# Patient Record
Sex: FEMALE | Race: WHITE | NOT HISPANIC OR LATINO | Employment: OTHER | URBAN - METROPOLITAN AREA
[De-identification: names, ages, dates, MRNs, and addresses within clinical notes are randomized per-mention and may not be internally consistent; named-entity substitution may affect disease eponyms.]

---

## 2018-02-12 ENCOUNTER — OFFICE VISIT (OUTPATIENT)
Dept: OBGYN CLINIC | Facility: HOSPITAL | Age: 68
End: 2018-02-12
Payer: COMMERCIAL

## 2018-02-12 VITALS
HEIGHT: 68 IN | SYSTOLIC BLOOD PRESSURE: 159 MMHG | WEIGHT: 148 LBS | HEART RATE: 53 BPM | BODY MASS INDEX: 22.43 KG/M2 | DIASTOLIC BLOOD PRESSURE: 92 MMHG

## 2018-02-12 DIAGNOSIS — M19.011 PRIMARY OSTEOARTHRITIS OF RIGHT SHOULDER: Primary | ICD-10-CM

## 2018-02-12 PROCEDURE — 99203 OFFICE O/P NEW LOW 30 MIN: CPT | Performed by: ORTHOPAEDIC SURGERY

## 2018-02-12 RX ORDER — ESCITALOPRAM OXALATE 10 MG/1
10 TABLET ORAL
COMMUNITY
Start: 2017-11-27

## 2018-02-12 RX ORDER — LEVOTHYROXINE SODIUM 0.12 MG/1
1 TABLET ORAL
COMMUNITY
Start: 2012-02-15

## 2018-02-12 RX ORDER — DICLOFENAC SODIUM 75 MG/1
TABLET, DELAYED RELEASE ORAL
COMMUNITY
Start: 2018-02-09 | End: 2018-04-09

## 2018-02-12 RX ORDER — INFLUENZA A VIRUSA/MICHIGAN/45/2015 X-275 (H1N1) ANTIGEN (FORMALDEHYDE INACTIVATED), INFLUENZA A VIRUS A/HONG KONG/4801/2014 X-263B (H3N2) ANTIGEN (FORMALDEHYDE INACTIVATED), AND INFLUENZA B VIRUS B/BRISBANE/60/2008 ANTIGEN (FORMALDEHYDE INACTIVATED) 60; 60; 60 UG/.5ML; UG/.5ML; UG/.5ML
INJECTION, SUSPENSION INTRAMUSCULAR
COMMUNITY
Start: 2017-11-20 | End: 2018-04-09

## 2018-02-12 NOTE — PATIENT INSTRUCTIONS
Patient to schedule CT scan of the right shoulder and an MRI of the right shoulder, both without contrast   Patient to return following studies to discuss possible surgical intervention

## 2018-02-12 NOTE — PROGRESS NOTES
Assessment/Plan:    Primary osteoarthritis right shoulder  The patient was seen by both Dr Zac Reece and myself  We reviewed the x-ray findings and discussed the importance of obtaining a CT scan and an MRI to assess the integrity of her rotator cuff as well as the bony structure, in anticipation of a possible total shoulder arthroplasty  Patient will return following studies being completed  Subjective:   Patient ID: Josh Rajan is a 75 y o  female  This 59-year-old female presents to the office for evaluation of her right shoulder pain  She reports that she has had pain for approximately 5 years now and her symptoms have progressively worsened  She has had intra-articular injections which has provided minimal relief, last 1 being last October  She has seen other orthopedic providers who have told her she has arthritis  She denies any numbness or tingling  She is right-hand dominant  Review of Systems   Constitutional: Negative for chills and fever  Respiratory: Negative for shortness of breath and wheezing  Musculoskeletal: Positive for arthralgias and myalgias  Neurological: Positive for weakness  Negative for numbness  Objective:  Right Shoulder Exam     Tenderness   The patient is experiencing tenderness in the biceps tendon  Range of Motion   Active Abduction:  40 abnormal   Passive Abduction:  60 abnormal   Forward Flexion:  60 abnormal   External Rotation:  20 abnormal     Muscle Strength   Abduction: 1/5   Internal Rotation: 1/5   External Rotation: 1/5   Supraspinatus: 1/5   Subscapularis: 1/5   Biceps: 4/5     Tests   Drop Arm: negative  Hawkin's test: positive  Sulcus: absent    Other   Erythema: absent  Scars: absent  Sensation: normal  Pulse: present          Neurological Testing     Additional Neurological Details  Sensation grossly intact  Physical Exam   Constitutional: She is oriented to person, place, and time   She appears well-developed and well-nourished  HENT:   Head: Normocephalic  Pulmonary/Chest: Effort normal    Neurological: She is alert and oriented to person, place, and time  Sensation grossly intact  Skin: Skin is warm and dry  Psychiatric: She has a normal mood and affect  Her behavior is normal        I have personally reviewed pertinent films in PACS  and I have personally reviewed pertinent films in PACS and my interpretation is There is primary osteoarthritis of the proximal right humerus and glenohumeral joint  Aleksandr Bhatia

## 2018-02-15 ENCOUNTER — HOSPITAL ENCOUNTER (OUTPATIENT)
Dept: RADIOLOGY | Facility: HOSPITAL | Age: 68
Discharge: HOME | End: 2018-02-15
Payer: COMMERCIAL

## 2018-02-15 DIAGNOSIS — M19.011 PRIMARY OSTEOARTHRITIS OF RIGHT SHOULDER: ICD-10-CM

## 2018-02-15 PROCEDURE — 73221 MRI JOINT UPR EXTREM W/O DYE: CPT

## 2018-02-15 PROCEDURE — 73200 CT UPPER EXTREMITY W/O DYE: CPT

## 2018-02-16 ENCOUNTER — TELEPHONE (OUTPATIENT)
Dept: OBGYN CLINIC | Facility: HOSPITAL | Age: 68
End: 2018-02-16

## 2018-02-16 NOTE — TELEPHONE ENCOUNTER
Lesvia Lin from [address] [place] [address] is calling to let you know that there are significant findings on the patients ct of rt shoulder

## 2018-02-19 ENCOUNTER — OFFICE VISIT (OUTPATIENT)
Dept: OBGYN CLINIC | Facility: HOSPITAL | Age: 68
End: 2018-02-19
Payer: COMMERCIAL

## 2018-02-19 VITALS
SYSTOLIC BLOOD PRESSURE: 164 MMHG | BODY MASS INDEX: 22.97 KG/M2 | HEART RATE: 59 BPM | HEIGHT: 68 IN | DIASTOLIC BLOOD PRESSURE: 90 MMHG | WEIGHT: 151.6 LBS

## 2018-02-19 DIAGNOSIS — M19.011 PRIMARY OSTEOARTHRITIS OF RIGHT SHOULDER: Primary | ICD-10-CM

## 2018-02-19 PROCEDURE — 99214 OFFICE O/P EST MOD 30 MIN: CPT | Performed by: ORTHOPAEDIC SURGERY

## 2018-02-19 NOTE — PROGRESS NOTES
Assessment/Plan:  Assessment/Plan   Diagnoses and all orders for this visit:    Primary osteoarthritis of right shoulder      Discussion:  The patient has glenohumeral osteoarthritis with an intact rotator cuff, has failed to improve with appropriate nonoperative care and is indicated for total shoulder arthroplasty, anatomic total shoulder is appropriate and I will have available a posterior augmented glenoid given the 10° of retroversion  A thorough discussion was performed with the patient reviewing all operative and nonoperative options as well as the risks of the procedure  Risks discussed include but not limited to persistent pain, dislocation, loosening of the prosthesis, infection, need for further surgery including revision to a reverse total shoulder, rotator cuff rupture , neurovascular injury, as well as the risk of anesthesia  After this discussion all questions were answered and informed consent was obtained for Total Shoulder Arthroplasty of the right shoulder          Subjective:   Patient ID: Indigo Gonzales is a 75 y o  female  Patient returns for follow-up of the imaging of her right shoulder to help us understand what type of arthroplasty is appropriate for her right shoulder  We discussed the process at length and she has stated to me today that she feels comfortable with me performing the procedure with the technique that I described, she continues to have significant pain in her right shoulder is looking good maybe schedule in May to have the shoulder replaced  The following portions of the patient's history were reviewed and updated as appropriate: allergies, current medications, past family history, past medical history, past social history, past surgical history and problem list     Review of Systems   Constitutional: Negative for chills and fever  HENT: Negative for hearing loss  Eyes: Negative for visual disturbance     Respiratory: Negative for shortness of breath  Cardiovascular: Negative for chest pain  Gastrointestinal: Negative for abdominal pain  Musculoskeletal:        As reviewed in the HPI   Skin: Negative for rash  Neurological:        As reviewed in the HPI   Psychiatric/Behavioral: Negative for agitation  Objective:  Right Shoulder Exam     Tenderness   Right shoulder tenderness location: Tender about glenohumeral joint  Muscle Strength   Abduction: 4/5   External Rotation: 4/5     Tests   Cross Arm: negative  Drop Arm: negative    Other   Erythema: absent  Scars: absent  Sensation: normal  Pulse: present    Comments:  Globally limited range of motion secondary to pain and crepitation            Physical Exam   Constitutional: She is oriented to person, place, and time  She appears well-developed and well-nourished  HENT:   Head: Normocephalic and atraumatic  Neck: Normal range of motion  Neck supple  Cardiovascular: Normal rate and regular rhythm  Pulmonary/Chest: Effort normal  She has no wheezes  Abdominal: Soft  She exhibits no distension  Neurological: She is alert and oriented to person, place, and time  Skin: Skin is warm and dry  Psychiatric: She has a normal mood and affect  Her behavior is normal    Nursing note and vitals reviewed  I have personally reviewed pertinent films in PACS and my interpretation is as follows      Right shoulder CT scan shows posterior glenoid wear with approximately 10° of retroversion    Right shoulder MRI shows advanced glenohumeral arthritis with an intact rotator cuff

## 2018-02-19 NOTE — PATIENT INSTRUCTIONS
What to Expect Before and After Shoulder Replacement Surgery  You are being scheduled for a shoulder replacement by Dr Bakari Hanna to treat your shoulder condition  Here is some information which may help to answer questions that you may have  Please do not hesitate to reach out to our team to answer questions not addressed here  Before Surgery  You will be contacted the evening prior to your surgery to confirm the scheduled time of the procedure and when to arrive at the hospital    Do not eat or drink anything after midnight the night before your surgery so that the anesthesia can be performed safely  If you have been fitted for a sling in the office prior to the surgery please remember to bring it to the hospital   You will meet the anesthesiologist the morning of the surgery  The surgery is performed under a general anesthetic but they will also offer you a regional block (shot to numb the arm) to help control your post-operative pain as well as with a catheter that is left in place after the block  The catheter is connected to a small pump which will continue to provide numbing medicine and help prolong the pain control from the block  Unfortunately this catheter is not as effective as the initial block, but can still be very helpful in managing the pain  After Surgery and in the Hospital  The shoulder replacement surgery typically takes 60-90 minutes  When surgery is completed, your surgeon will update your family and friends on your condition and progress  You will remain in the recovery room for at least an hour or until the anesthesia has worn off and your blood pressure and pulse are stable  If you have pain, the nurses will give you medication  Once out of surgery, your surgeon will decide on how long you will be using a sling in order to protect and position your shoulder  However, this won't keep you from starting physical therapy    Exercises typically begin on the day after surgery with emphasis on moving the shoulder, wrist, and hand  The physical therapist will be provided with a detailed protocol but typically the first 6 weeks are used to regain range of motion and then strengthening is initiated  Starting strengthening exercises too early may lead to complications  When You Are Discharged from the [address] [place] [address] can expect to be released from the hospital the day after surgery (this may change if you have special needs or medical conditions)  Before you are released, the treatment team (Orthopaedic surgery residents, physician assistants and physical therapists) will talk with you about the importance of limiting any sudden or stressful movements to the arm for several weeks or longer  Activities that involve pushing, pulling, and lifting should not be done until you are given permission from your surgeon  Your First Day at [address] [place] [address] may need help with your daily activities, so it is a good idea to have family and friends prepared to help you  It is okay to remove the sling and let the arm hang at the side so that you can get cleaned and change your clothes  To put on a shirt, place the bad arm in first and then the good arm  Reverse to take it off  Don't forget to wear the sling every night for at least the first month after surgery, and never use your arm to push yourself up in bed or from a chair  The added weight on your shoulder may cause you to re-injure the joint  How to [place] in the First Week  You are encouraged to return to your normal eating and sleeping patterns as soon as possible  It is important for you to be active in order to control your weight and muscle tone  It is ok to increase your activity level and even perform light aerobic exercise (like walking or riding a stationary bike) within the first week or so if you are feeling up to it    If there is concern about these activates best to wait until the first post-operative visit and discuss this with the celia Gomez Ridchance might be able to return to work within several days if you can perform your job while wearing a sling  Consult with your doctor, as this differs from patient to patient  However, if your job requires heavy lifting or climbing, there may be a delay for several months  Until you are seen for your first follow-up visit, please try and keep wound dry  It is okay to shower but try your best to keep the incision out of direct contact with the water (or consider using a waterproof bandage)  If it does gets wet please dry as best as possible afterwards  If you notice any drainage or a foul odor from your incision or your temperature goes above 101 5 degrees, please contact the office  What You Can Expect in the First Month  Your first post-operative appointment will be with Dr Bruno Sahu physician assistant (PA) around 2 weeks after the surgery  You skin staples will be removed and X-rays will be obtained at that visit  Please understand that it is quite common to still experience pain at that time but the pain should be steadily improving  Hopefully physical therapy has already begun but if not it will be initiated at this visit and will continue for the 8-12 weeks  At about 12 weeks after surgery you will start a progressive strengthening program  Physical therapy is a deliberate process of not only strengthening your shoulder but also altering how you use your arm  It may be many months before your desired results are achieved, so do not get discouraged  Your shoulder will generally continue to improve steadily up to 6-8 months after surgery  After that point further improvement is very slow; although it has been shown that even after a year or more, activity can increase as muscle strength continues to improve  After the First Month at Home   Because each person heals differently, there are different recovery timelines  An average recovery period typically lasts about between 3-6 months  Talk with your surgeon about which activities will be appropriate for you once you have recovered

## 2018-03-15 DIAGNOSIS — M19.011 ARTHRITIS OF RIGHT SHOULDER REGION: Primary | ICD-10-CM

## 2018-04-09 RX ORDER — GABAPENTIN 100 MG/1
100 CAPSULE ORAL
COMMUNITY
End: 2019-07-25

## 2018-04-09 NOTE — PRE-PROCEDURE INSTRUCTIONS
Pre-Surgery Instructions:   Medication Instructions    Calcium Carbonate-Vitamin D (CALTRATE 600+D PO) Instructed patient per Anesthesia Guidelines   Cholecalciferol 1000 units tablet Instructed patient per Anesthesia Guidelines   escitalopram (LEXAPRO) 10 mg tablet epic    levothyroxine 125 mcg tablet epic    Resveratrol-Quercetin 100-100 MG TABS Instructed patient per Anesthesia Guidelines  Antidepressant Med Class     Continue to take this medication on your normal schedule  If this is an oral medication and you take it in the morning, then you may take this medicine with a sip of water  Herbal Med Class     Stop taking this herbal medications at least one week prior to surgery/procedure  Thyroxine Med Class     Continue to take this medication on your normal schedule  If this is an oral medication and you take it in the morning, then you may take this medicine with a sip of water    Pre Procedure instructions reviewed verbalizes understanding

## 2018-04-23 ENCOUNTER — EVALUATION (OUTPATIENT)
Dept: PHYSICAL THERAPY | Facility: REHABILITATION | Age: 68
End: 2018-04-23
Payer: COMMERCIAL

## 2018-04-23 DIAGNOSIS — M19.011 PRIMARY OSTEOARTHRITIS OF RIGHT SHOULDER: Primary | ICD-10-CM

## 2018-04-23 PROCEDURE — G8991 OTHER PT/OT GOAL STATUS: HCPCS | Performed by: PHYSICAL THERAPIST

## 2018-04-23 PROCEDURE — 97161 PT EVAL LOW COMPLEX 20 MIN: CPT | Performed by: PHYSICAL THERAPIST

## 2018-04-23 PROCEDURE — 97110 THERAPEUTIC EXERCISES: CPT | Performed by: PHYSICAL THERAPIST

## 2018-04-23 PROCEDURE — G8990 OTHER PT/OT CURRENT STATUS: HCPCS | Performed by: PHYSICAL THERAPIST

## 2018-04-23 NOTE — PROGRESS NOTES
PT Evaluation     Today's date: 2018  Patient name: Jewels Bradford  : 1950  MRN: [id number]  Referring provider: Edgar Allen MD  Dx:   Encounter Diagnosis     ICD-10-CM    1  Primary osteoarthritis of right shoulder M19 [id number] Ambulatory referral to Physical Therapy                  Assessment    Assessment details: Patient presents with  Shoulder pain, decreased ROM, decreased strength, and decreased function secondary to R shoulder OA  Pt is scheduled for R TSA 18  Patient would benefit from skilled PT intervention post-operatively  to address these issues and to maximize function  Thank you for the referral   Understanding of Dx/Px/POC: good   Prognosis: good    Goals  Short Term:  Pt will report decreased levels of pain by at least 2 subjective ratings in 4 weeks  Pt will demonstrate improved ROM by at least 10 degrees in 4 weeks  Pt will be independent with current HEP  Long term goals (to be achieved in 12 weeks):  Pt will demonstrate functional AROM  Pt will be independent in HEP Long Term  Pt will demonstrate improved FOTO, > 63  Pt will be independent with all ADL's    Plan  Planned modality interventions: cryotherapy  Planned therapy interventions: joint mobilization, manual therapy, neuromuscular re-education, patient education, therapeutic exercise, activity modification and home exercise program  Frequency: 2x week  Duration in visits: 12  Treatment plan discussed with: patient        Subjective Evaluation    History of Present Illness  Mechanism of injury: Pt is a 70 y o female with a c/o ongoing R shoulder pain for at least 5 years of insidious onset  Pt was diagnosed with OA and has been getting a series of injections  Pt saw Dr Ginny Snell in February for another opinion  Pt had radiographs, CT scan, and MRI, which showed OA and wearing away of the glenoid, as per pt  Pt is scheduled for surgery 17    Pt is R hand dominant and reports pain/difficulty with dressing (compensates), lifting things, carrying things, reaching, OH activity, weight-bearing through R UE  Pt would like to resume gardening, golfing, swimming  Pain  At best pain ratin  At worst pain ratin  Location: R shoulder  Relieving factors: ice and medications          Objective     Active Range of Motion     Right Shoulder   Flexion: 70 degrees with pain  Abduction: 48 degrees with pain  External rotation 0°: 50 degrees with pain  Internal rotation BTB: sacrum with pain    Passive Range of Motion     Right Shoulder   Flexion: 140 degrees with pain  Abduction: 120 degrees with pain  External rotation 90°: 50 degrees with pain  Internal rotation 90°: 20 degrees with pain    Strength/Myotome Testing     Right Shoulder     Planes of Motion   Flexion: 3-   Abduction: 3-   External rotation at 0°: 4-   Internal rotation at 0°: 4-     General Comments     Shoulder Comments   Pt reports consistent crepitus  Reviewed TSA protocol and HEP this session  Pt demonstrates good understanding of both  Flowsheet Rows    Flowsheet Row Most Recent Value   PT/OT G-Codes   Current Score  49   Projected Score  61   FOTO information reviewed  Yes [Pre-op evaluation]   Assessment Type  Evaluation   G code set  Other PT/OT Primary   Other PT Primary Current Status ()  CK   Other PT Primary Goal Status ()  CJ          Precautions: Oa, HTN, history of thyroid cancer and thyroid surgery  Follow MD's protocol      Daily Treatment Diary     Manual              R shoulder PROM flexion, ER to limit set by MD (elbow at side), Ir, Cross body adduction                                                                     Exercise Diary              Pendulums a/p             scap squeeze             Elbow AROM             Wrist AROM             digiflex             C/S AROM (SB+rot) Modalities              CP PRN

## 2018-04-25 RX ORDER — VALSARTAN 160 MG/1
160 TABLET ORAL
COMMUNITY

## 2018-04-25 RX ORDER — VALSARTAN 160 MG/1
TABLET ORAL
Status: ON HOLD | COMMUNITY
Start: 2018-04-17 | End: 2018-05-01

## 2018-04-30 ENCOUNTER — ANESTHESIA EVENT (OUTPATIENT)
Dept: PERIOP | Facility: HOSPITAL | Age: 68
DRG: 483 | End: 2018-04-30
Payer: COMMERCIAL

## 2018-05-01 ENCOUNTER — ANESTHESIA (OUTPATIENT)
Dept: PERIOP | Facility: HOSPITAL | Age: 68
DRG: 483 | End: 2018-05-01
Payer: COMMERCIAL

## 2018-05-01 ENCOUNTER — HOSPITAL ENCOUNTER (INPATIENT)
Facility: HOSPITAL | Age: 68
LOS: 1 days | Discharge: HOME | DRG: 483 | End: 2018-05-02
Attending: ORTHOPAEDIC SURGERY | Admitting: ORTHOPAEDIC SURGERY
Payer: COMMERCIAL

## 2018-05-01 ENCOUNTER — APPOINTMENT (INPATIENT)
Dept: RADIOLOGY | Facility: HOSPITAL | Age: 68
DRG: 483 | End: 2018-05-01
Payer: COMMERCIAL

## 2018-05-01 DIAGNOSIS — M19.011 PRIMARY OSTEOARTHRITIS OF RIGHT SHOULDER: Primary | ICD-10-CM

## 2018-05-01 LAB
ABO GROUP BLD: NORMAL
BLD GP AB SCN SERPL QL: NEGATIVE
RH BLD: POSITIVE
SPECIMEN EXPIRATION DATE: NORMAL

## 2018-05-01 PROCEDURE — C1713 ANCHOR/SCREW BN/BN,TIS/BN: HCPCS | Performed by: ORTHOPAEDIC SURGERY

## 2018-05-01 PROCEDURE — C1776 JOINT DEVICE (IMPLANTABLE): HCPCS | Performed by: ORTHOPAEDIC SURGERY

## 2018-05-01 PROCEDURE — 23430 REPAIR BICEPS TENDON: CPT | Performed by: ORTHOPAEDIC SURGERY

## 2018-05-01 PROCEDURE — 23472 RECONSTRUCT SHOULDER JOINT: CPT | Performed by: ORTHOPAEDIC SURGERY

## 2018-05-01 PROCEDURE — 73020 X-RAY EXAM OF SHOULDER: CPT

## 2018-05-01 PROCEDURE — 0RRJ0JZ REPLACEMENT OF RIGHT SHOULDER JOINT WITH SYNTHETIC SUBSTITUTE, OPEN APPROACH: ICD-10-PCS | Performed by: ORTHOPAEDIC SURGERY

## 2018-05-01 PROCEDURE — 86901 BLOOD TYPING SEROLOGIC RH(D): CPT | Performed by: ORTHOPAEDIC SURGERY

## 2018-05-01 PROCEDURE — 86900 BLOOD TYPING SEROLOGIC ABO: CPT | Performed by: ORTHOPAEDIC SURGERY

## 2018-05-01 PROCEDURE — 0LS30ZZ REPOSITION RIGHT UPPER ARM TENDON, OPEN APPROACH: ICD-10-PCS | Performed by: ORTHOPAEDIC SURGERY

## 2018-05-01 PROCEDURE — 86850 RBC ANTIBODY SCREEN: CPT | Performed by: ORTHOPAEDIC SURGERY

## 2018-05-01 DEVICE — SMARTSET HV HIGH VISCOSITY BONE CEMENT 40G
Type: IMPLANTABLE DEVICE | Site: SHOULDER | Status: FUNCTIONAL
Brand: SMARTSET

## 2018-05-01 DEVICE — IMPLANTABLE DEVICE
Type: IMPLANTABLE DEVICE | Site: SHOULDER | Status: FUNCTIONAL
Brand: AEQUALIS™ ASCEND™ FLEX

## 2018-05-01 DEVICE — IMPLANTABLE DEVICE
Type: IMPLANTABLE DEVICE | Site: SHOULDER | Status: FUNCTIONAL
Brand: FLEX SHOULDER SYSTEM

## 2018-05-01 DEVICE — IMPLANTABLE DEVICE: Type: IMPLANTABLE DEVICE | Site: SHOULDER | Status: FUNCTIONAL

## 2018-05-01 RX ORDER — OXYCODONE HYDROCHLORIDE 10 MG/1
10 TABLET ORAL EVERY 4 HOURS PRN
Status: DISCONTINUED | OUTPATIENT
Start: 2018-05-01 | End: 2018-05-02 | Stop reason: HOSPADM

## 2018-05-01 RX ORDER — OXYCODONE HYDROCHLORIDE 5 MG/1
5 TABLET ORAL EVERY 4 HOURS PRN
Status: DISCONTINUED | OUTPATIENT
Start: 2018-05-01 | End: 2018-05-02 | Stop reason: HOSPADM

## 2018-05-01 RX ORDER — LEVOTHYROXINE SODIUM 0.12 MG/1
125 TABLET ORAL
Status: DISCONTINUED | OUTPATIENT
Start: 2018-05-01 | End: 2018-05-02 | Stop reason: HOSPADM

## 2018-05-01 RX ORDER — SODIUM CHLORIDE, SODIUM LACTATE, POTASSIUM CHLORIDE, CALCIUM CHLORIDE 600; 310; 30; 20 MG/100ML; MG/100ML; MG/100ML; MG/100ML
125 INJECTION, SOLUTION INTRAVENOUS CONTINUOUS
Status: DISCONTINUED | OUTPATIENT
Start: 2018-05-01 | End: 2018-05-02 | Stop reason: HOSPADM

## 2018-05-01 RX ORDER — SODIUM CHLORIDE, SODIUM LACTATE, POTASSIUM CHLORIDE, CALCIUM CHLORIDE 600; 310; 30; 20 MG/100ML; MG/100ML; MG/100ML; MG/100ML
20 INJECTION, SOLUTION INTRAVENOUS CONTINUOUS
Status: DISCONTINUED | OUTPATIENT
Start: 2018-05-01 | End: 2018-05-01

## 2018-05-01 RX ORDER — SENNOSIDES 8.6 MG
1 TABLET ORAL DAILY
Status: DISCONTINUED | OUTPATIENT
Start: 2018-05-01 | End: 2018-05-02 | Stop reason: HOSPADM

## 2018-05-01 RX ORDER — EPHEDRINE SULFATE 50 MG/ML
INJECTION, SOLUTION INTRAVENOUS AS NEEDED
Status: DISCONTINUED | OUTPATIENT
Start: 2018-05-01 | End: 2018-05-01 | Stop reason: SURG

## 2018-05-01 RX ORDER — ONDANSETRON 2 MG/ML
INJECTION INTRAMUSCULAR; INTRAVENOUS AS NEEDED
Status: DISCONTINUED | OUTPATIENT
Start: 2018-05-01 | End: 2018-05-01 | Stop reason: SURG

## 2018-05-01 RX ORDER — FENTANYL CITRATE/PF 50 MCG/ML
25 SYRINGE (ML) INJECTION
Status: DISCONTINUED | OUTPATIENT
Start: 2018-05-01 | End: 2018-05-01 | Stop reason: HOSPADM

## 2018-05-01 RX ORDER — DOCUSATE SODIUM 100 MG/1
100 CAPSULE, LIQUID FILLED ORAL 2 TIMES DAILY
Status: DISCONTINUED | OUTPATIENT
Start: 2018-05-01 | End: 2018-05-02 | Stop reason: HOSPADM

## 2018-05-01 RX ORDER — PROPOFOL 10 MG/ML
INJECTION, EMULSION INTRAVENOUS AS NEEDED
Status: DISCONTINUED | OUTPATIENT
Start: 2018-05-01 | End: 2018-05-01 | Stop reason: SURG

## 2018-05-01 RX ORDER — MIDAZOLAM HYDROCHLORIDE 1 MG/ML
INJECTION INTRAMUSCULAR; INTRAVENOUS AS NEEDED
Status: DISCONTINUED | OUTPATIENT
Start: 2018-05-01 | End: 2018-05-01 | Stop reason: SURG

## 2018-05-01 RX ORDER — ACETAMINOPHEN 325 MG/1
650 TABLET ORAL EVERY 6 HOURS PRN
COMMUNITY
End: 2019-07-25

## 2018-05-01 RX ORDER — MORPHINE SULFATE 2 MG/ML
2 INJECTION, SOLUTION INTRAMUSCULAR; INTRAVENOUS
Status: DISCONTINUED | OUTPATIENT
Start: 2018-05-01 | End: 2018-05-02 | Stop reason: HOSPADM

## 2018-05-01 RX ORDER — FENTANYL CITRATE 50 UG/ML
INJECTION, SOLUTION INTRAMUSCULAR; INTRAVENOUS AS NEEDED
Status: DISCONTINUED | OUTPATIENT
Start: 2018-05-01 | End: 2018-05-01 | Stop reason: SURG

## 2018-05-01 RX ORDER — GABAPENTIN 100 MG/1
100 CAPSULE ORAL
Status: DISCONTINUED | OUTPATIENT
Start: 2018-05-01 | End: 2018-05-02 | Stop reason: HOSPADM

## 2018-05-01 RX ORDER — CALCIUM CARBONATE 200(500)MG
1000 TABLET,CHEWABLE ORAL DAILY PRN
Status: DISCONTINUED | OUTPATIENT
Start: 2018-05-01 | End: 2018-05-02 | Stop reason: HOSPADM

## 2018-05-01 RX ORDER — LIDOCAINE HYDROCHLORIDE 10 MG/ML
INJECTION, SOLUTION INFILTRATION; PERINEURAL AS NEEDED
Status: DISCONTINUED | OUTPATIENT
Start: 2018-05-01 | End: 2018-05-01 | Stop reason: SURG

## 2018-05-01 RX ORDER — MIDAZOLAM HYDROCHLORIDE 1 MG/ML
INJECTION INTRAMUSCULAR; INTRAVENOUS AS NEEDED
Status: DISCONTINUED | OUTPATIENT
Start: 2018-05-01 | End: 2018-05-01

## 2018-05-01 RX ORDER — GLYCOPYRROLATE 0.2 MG/ML
INJECTION INTRAMUSCULAR; INTRAVENOUS AS NEEDED
Status: DISCONTINUED | OUTPATIENT
Start: 2018-05-01 | End: 2018-05-01 | Stop reason: SURG

## 2018-05-01 RX ORDER — SODIUM CHLORIDE, SODIUM LACTATE, POTASSIUM CHLORIDE, CALCIUM CHLORIDE 600; 310; 30; 20 MG/100ML; MG/100ML; MG/100ML; MG/100ML
INJECTION, SOLUTION INTRAVENOUS CONTINUOUS PRN
Status: DISCONTINUED | OUTPATIENT
Start: 2018-05-01 | End: 2018-05-01

## 2018-05-01 RX ORDER — ONDANSETRON 2 MG/ML
4 INJECTION INTRAMUSCULAR; INTRAVENOUS EVERY 6 HOURS PRN
Status: DISCONTINUED | OUTPATIENT
Start: 2018-05-01 | End: 2018-05-02 | Stop reason: HOSPADM

## 2018-05-01 RX ORDER — OXYCODONE HYDROCHLORIDE 5 MG/1
TABLET ORAL
Qty: 30 TABLET | Refills: 0 | Status: SHIPPED | OUTPATIENT
Start: 2018-05-01 | End: 2018-07-16 | Stop reason: ALTCHOICE

## 2018-05-01 RX ORDER — ROCURONIUM BROMIDE 10 MG/ML
INJECTION, SOLUTION INTRAVENOUS AS NEEDED
Status: DISCONTINUED | OUTPATIENT
Start: 2018-05-01 | End: 2018-05-01 | Stop reason: SURG

## 2018-05-01 RX ORDER — HYDRALAZINE HYDROCHLORIDE 25 MG/1
25 TABLET, FILM COATED ORAL EVERY 8 HOURS PRN
Status: DISCONTINUED | OUTPATIENT
Start: 2018-05-01 | End: 2018-05-02 | Stop reason: HOSPADM

## 2018-05-01 RX ORDER — ESCITALOPRAM OXALATE 10 MG/1
10 TABLET ORAL
Status: DISCONTINUED | OUTPATIENT
Start: 2018-05-01 | End: 2018-05-02 | Stop reason: HOSPADM

## 2018-05-01 RX ORDER — ACETAMINOPHEN 325 MG/1
650 TABLET ORAL EVERY 6 HOURS PRN
Status: DISCONTINUED | OUTPATIENT
Start: 2018-05-01 | End: 2018-05-02 | Stop reason: HOSPADM

## 2018-05-01 RX ORDER — VALSARTAN 160 MG/1
160 TABLET ORAL
Status: DISCONTINUED | OUTPATIENT
Start: 2018-05-02 | End: 2018-05-01

## 2018-05-01 RX ORDER — ONDANSETRON 2 MG/ML
4 INJECTION INTRAMUSCULAR; INTRAVENOUS ONCE AS NEEDED
Status: DISCONTINUED | OUTPATIENT
Start: 2018-05-01 | End: 2018-05-01 | Stop reason: HOSPADM

## 2018-05-01 RX ADMIN — GLYCOPYRROLATE 0.2 MG: 0.2 INJECTION, SOLUTION INTRAMUSCULAR; INTRAVENOUS at 09:20

## 2018-05-01 RX ADMIN — ESCITALOPRAM OXALATE 10 MG: 10 TABLET, FILM COATED ORAL at 21:24

## 2018-05-01 RX ADMIN — ACETAMINOPHEN 650 MG: 325 TABLET, FILM COATED ORAL at 15:32

## 2018-05-01 RX ADMIN — PROPOFOL 150 MG: 10 INJECTION, EMULSION INTRAVENOUS at 07:48

## 2018-05-01 RX ADMIN — ROCURONIUM BROMIDE 10 MG: 10 INJECTION INTRAVENOUS at 09:03

## 2018-05-01 RX ADMIN — ROCURONIUM BROMIDE 30 MG: 10 INJECTION INTRAVENOUS at 07:48

## 2018-05-01 RX ADMIN — EPHEDRINE SULFATE 10 MG: 50 INJECTION, SOLUTION INTRAMUSCULAR; INTRAVENOUS; SUBCUTANEOUS at 07:59

## 2018-05-01 RX ADMIN — GLYCOPYRROLATE 0.2 MG: 0.2 INJECTION, SOLUTION INTRAMUSCULAR; INTRAVENOUS at 08:02

## 2018-05-01 RX ADMIN — ONDANSETRON 4 MG: 2 INJECTION INTRAMUSCULAR; INTRAVENOUS at 08:35

## 2018-05-01 RX ADMIN — SENNOSIDES 8.6 MG: 8.6 TABLET ORAL at 12:28

## 2018-05-01 RX ADMIN — NEOSTIGMINE METHYLSULFATE 3 MG: 1 INJECTION, SOLUTION INTRAMUSCULAR; INTRAVENOUS; SUBCUTANEOUS at 09:19

## 2018-05-01 RX ADMIN — OXYCODONE HYDROCHLORIDE 10 MG: 10 TABLET ORAL at 18:15

## 2018-05-01 RX ADMIN — EPHEDRINE SULFATE 5 MG: 50 INJECTION, SOLUTION INTRAMUSCULAR; INTRAVENOUS; SUBCUTANEOUS at 08:35

## 2018-05-01 RX ADMIN — ACETAMINOPHEN 650 MG: 325 TABLET, FILM COATED ORAL at 21:53

## 2018-05-01 RX ADMIN — Medication 2000 MG: at 07:45

## 2018-05-01 RX ADMIN — MIDAZOLAM HYDROCHLORIDE 1 MG: 1 INJECTION, SOLUTION INTRAMUSCULAR; INTRAVENOUS at 07:39

## 2018-05-01 RX ADMIN — SODIUM CHLORIDE, SODIUM LACTATE, POTASSIUM CHLORIDE, AND CALCIUM CHLORIDE 125 ML/HR: .6; .31; .03; .02 INJECTION, SOLUTION INTRAVENOUS at 10:29

## 2018-05-01 RX ADMIN — GLYCOPYRROLATE 0.4 MG: 0.2 INJECTION, SOLUTION INTRAMUSCULAR; INTRAVENOUS at 09:19

## 2018-05-01 RX ADMIN — EPHEDRINE SULFATE 5 MG: 50 INJECTION, SOLUTION INTRAMUSCULAR; INTRAVENOUS; SUBCUTANEOUS at 08:29

## 2018-05-01 RX ADMIN — DEXAMETHASONE SODIUM PHOSPHATE 10 MG: 10 INJECTION INTRAMUSCULAR; INTRAVENOUS at 08:03

## 2018-05-01 RX ADMIN — FENTANYL CITRATE 50 MCG: 50 INJECTION, SOLUTION INTRAMUSCULAR; INTRAVENOUS at 08:20

## 2018-05-01 RX ADMIN — GABAPENTIN 100 MG: 100 CAPSULE ORAL at 21:24

## 2018-05-01 RX ADMIN — FENTANYL CITRATE 50 MCG: 50 INJECTION, SOLUTION INTRAMUSCULAR; INTRAVENOUS at 07:48

## 2018-05-01 RX ADMIN — LIDOCAINE HYDROCHLORIDE 50 MG: 10 INJECTION, SOLUTION INFILTRATION; PERINEURAL at 07:48

## 2018-05-01 RX ADMIN — DOCUSATE SODIUM 100 MG: 100 CAPSULE, LIQUID FILLED ORAL at 18:15

## 2018-05-01 RX ADMIN — SODIUM CHLORIDE, SODIUM LACTATE, POTASSIUM CHLORIDE, AND CALCIUM CHLORIDE: .6; .31; .03; .02 INJECTION, SOLUTION INTRAVENOUS at 07:00

## 2018-05-01 RX ADMIN — MIDAZOLAM HYDROCHLORIDE 1 MG: 1 INJECTION, SOLUTION INTRAMUSCULAR; INTRAVENOUS at 07:22

## 2018-05-01 RX ADMIN — OXYCODONE HYDROCHLORIDE 10 MG: 10 TABLET ORAL at 12:29

## 2018-05-01 RX ADMIN — LEVOTHYROXINE SODIUM 125 MCG: 125 TABLET ORAL at 12:28

## 2018-05-01 NOTE — ANESTHESIA POSTPROCEDURE EVALUATION
Post-Op Assessment Note      CV Status:  Stable    Mental Status:  Lethargic    Hydration Status:  Stable    PONV Controlled:  Controlled    Airway Patency:  Patent and adequate    Post Op Vitals Reviewed: Yes          Staff: CRNA     Post-op block assessment: secured with tape        BP   133/67   Temp   97   Pulse  58   Resp   14   SpO2   100

## 2018-05-01 NOTE — DISCHARGE INSTRUCTIONS
Refill request for gabapentin (NEURONTIN) 300 MG capsule  Last filled 2/27/17  Last appointment 5/10/17  Forwarded to Dr. Flores for review.   What to Expect Before and After Shoulder Replacement Surgery  You are being scheduled for a shoulder replacement by Dr Dragan Conroy to treat your shoulder condition  Here is some information which may help to answer questions that you may have  Please do not hesitate to reach out to our team to answer questions not addressed here  Before Surgery  You will be contacted the evening prior to your surgery to confirm the scheduled time of the procedure and when to arrive at the hospital    Do not eat or drink anything after midnight the night before your surgery so that the anesthesia can be performed safely  If you have been fitted for a sling in the office prior to the surgery please remember to bring it to the hospital   You will meet the anesthesiologist the morning of the surgery  The surgery is performed under a general anesthetic but they will also offer you a regional block (shot to numb the arm) to help control your post-operative pain as well as with a catheter that is left in place after the block  The catheter is connected to a small pump which will continue to provide numbing medicine and help prolong the pain control from the block  Unfortunately this catheter is not as effective as the initial block, but can still be very helpful in managing the pain  After Surgery and in the Hospital  The shoulder replacement surgery typically takes 60-90 minutes  When surgery is completed, your surgeon will update your family and friends on your condition and progress  You will remain in the recovery room for at least an hour or until the anesthesia has worn off and your blood pressure and pulse are stable  If you have pain, the nurses will give you medication  Once out of surgery, your surgeon will decide on how long you will be using a sling in order to protect and position your shoulder  However, this won't keep you from starting physical therapy    Exercises typically begin on the day after surgery with emphasis on moving the shoulder, wrist, and hand  The physical therapist will be provided with a detailed protocol but typically the first 6 weeks are used to regain range of motion and then strengthening is initiated  Starting strengthening exercises too early may lead to complications  When You Are Discharged from the [address] [place] [address] can expect to be released from the hospital the day after surgery (this may change if you have special needs or medical conditions)  Before you are released, the treatment team (Orthopaedic surgery residents, physician assistants and physical therapists) will talk with you about the importance of limiting any sudden or stressful movements to the arm for several weeks or longer  Activities that involve pushing, pulling, and lifting should not be done until you are given permission from your surgeon  Your First Day at [address] [place] [address] may need help with your daily activities, so it is a good idea to have family and friends prepared to help you  It is okay to remove the sling and let the arm hang at the side so that you can get cleaned and change your clothes  To put on a shirt, place the bad arm in first and then the good arm  Reverse to take it off  Don't forget to wear the sling every night for at least the first month after surgery, and never use your arm to push yourself up in bed or from a chair  The added weight on your shoulder may cause you to re-injure the joint  How to [place] in the First Week  You are encouraged to return to your normal eating and sleeping patterns as soon as possible  It is important for you to be active in order to control your weight and muscle tone  It is ok to increase your activity level and even perform light aerobic exercise (like walking or riding a stationary bike) within the first week or so if you are feeling up to it    If there is concern about these activates best to wait until the first post-operative visit and discuss this with the team    Yovanny Hampton might be able to return to work within several days if you can perform your job while wearing a sling  Consult with your doctor, as this differs from patient to patient  However, if your job requires heavy lifting or climbing, there may be a delay for several months  Until you are seen for your first follow-up visit, please try and keep wound dry  It is okay to shower but try your best to keep the incision out of direct contact with the water (or consider using a waterproof bandage)  If it does gets wet please dry as best as possible afterwards  If you notice any drainage or a foul odor from your incision or your temperature goes above 101 5 degrees, please contact the office  What You Can Expect in the First Month  Your first post-operative appointment will be with Dr Marce Watson physician assistant (PA) around 2 weeks after the surgery  You skin staples will be removed and X-rays will be obtained at that visit  Please understand that it is quite common to still experience pain at that time but the pain should be steadily improving  Hopefully physical therapy has already begun but if not it will be initiated at this visit and will continue for the 8-12 weeks  At about 12 weeks after surgery you will start a progressive strengthening program  Physical therapy is a deliberate process of not only strengthening your shoulder but also altering how you use your arm  It may be many months before your desired results are achieved, so do not get discouraged  Your shoulder will generally continue to improve steadily up to 6-8 months after surgery  After that point further improvement is very slow; although it has been shown that even after a year or more, activity can increase as muscle strength continues to improve  After the First Month at Home   Because each person heals differently, there are different recovery timelines  An average recovery period typically lasts about between 3-6 months  Talk with your surgeon about which activities will be appropriate for you once you have recovered

## 2018-05-01 NOTE — ANESTHESIA PROCEDURE NOTES
Peripheral Block    Patient location during procedure: holding area  Start time: 5/1/2018 7:14 AM  Reason for block: at surgeon's request and post-op pain management  Staffing  Anesthesiologist: Lisa Robbins  Performed: anesthesiologist   Preanesthetic Checklist  Completed: patient identified, site marked, surgical consent, pre-op evaluation, timeout performed, IV checked, risks and benefits discussed and monitors and equipment checked  Peripheral Block  Patient position: sitting  Prep: ChloraPrep  Patient monitoring: continuous pulse ox  Block type: interscalene  Laterality: right  Injection technique: catheter  Procedures: ultrasound guided  Ultrasound permanent image saved  Local infiltration: bupivacaine  Infiltration strength: 0 5 %  Dose: 15 mL  Needle  Needle type: Stimuplex   Needle gauge: 22 G  Needle length: 10 cm  Needle localization: ultrasound guidance  Needle insertion depth: 6 cm  Catheter type: open end  Catheter size: 18 G  Catheter at skin depth: 6 cm  Assessment  Injection assessment: incremental injection, local visualized surrounding nerve on ultrasound, negative aspiration for heme and no paresthesia on injection  Paresthesia pain: none  Heart rate change: no  Slow fractionated injection: yes  Post-procedure:  site cleaned  patient tolerated the procedure well with no immediate complications

## 2018-05-01 NOTE — CONSULTS
Consultation - Jewels Bradford 70 y o  female MRN: [id number]    Unit/Bed#: CW3 337-01 Encounter: [id number]        History of Present Illness     HPI: Jewels Bradford is a 70y o  year old female, with PMH of HTN, hypothyroidism, and depression, who presents for an elective Rt TSA by Dr Gross Conception  She had failed conservative treatment  She tolerated the surgery well with an EBL of 90ml  Pt currently denies any complaints  ROS:  Constitutional: Negative  HENT: Negative  Respiratory: Negative  Cardiovascular: Negative  Gastrointestinal: Negative  Musculoskeletal: Negative  Neurological: Negative  Psychiatric/Behavioral: Negative          Historical Information   Past Medical History:   Diagnosis Date    Cancer ([facility] [address] )     thyroid    Depression     Disease of thyroid gland     Fractures     Hypertension     Osteoarthritis     Vascular disorder      Past Surgical History:   Procedure Laterality Date    COLONOSCOPY      THYROID SURGERY      TONSILLECTOMY      TOOTH EXTRACTION      TUBAL LIGATION      VEIN LIGATION AND STRIPPING       Social History   History   Alcohol Use    Yes     Comment: social  x4/5 week     History   Drug Use No     History   Smoking Status    Former Smoker   Smokeless Tobacco    Never Used     Family History   Problem Relation Age of Onset    Heart disease Mother     Cancer Mother     Cancer Father     Diabetes Father        Meds/Allergies   current meds:  Current Facility-Administered Medications   Medication Dose Route Frequency    acetaminophen (TYLENOL) tablet 650 mg  650 mg Oral Q6H PRN    calcium carbonate (TUMS) chewable tablet 1,000 mg  1,000 mg Oral Daily PRN    ceFAZolin (ANCEF) 1,000 mg in sodium chloride 0 9 % 50 mL IVPB  1,000 mg Intravenous Q8H    docusate sodium (COLACE) capsule 100 mg  100 mg Oral BID    escitalopram (LEXAPRO) tablet 10 mg  10 mg Oral HS    gabapentin (NEURONTIN) capsule 100 mg  100 mg Oral HS    lactated ringers infusion  125 mL/hr Intravenous Continuous    lactated ringers infusion  20 mL/hr Intravenous Continuous    levothyroxine tablet 125 mcg  125 mcg Oral Early Morning    morphine injection 2 mg  2 mg Intravenous Q1H PRN    ondansetron (ZOFRAN) injection 4 mg  4 mg Intravenous Q6H PRN    oxyCODONE (ROXICODONE) immediate release tablet 10 mg  10 mg Oral Q4H PRN    oxyCODONE (ROXICODONE) IR tablet 5 mg  5 mg Oral Q4H PRN    ropivacaine (NAROPIN) 0 2 % 600 mL in elastomeric reservoir continuous peripheral nerve block   Subcutaneous Continuous    senna (SENOKOT) tablet 8 6 mg  1 tablet Oral Daily    [START ON 5/2/2018] valsartan (DIOVAN) tablet 160 mg  160 mg Oral HS       PTA meds:   Prescriptions Prior to Admission   Medication    acetaminophen (TYLENOL) 325 mg tablet    Calcium Carbonate-Vitamin D (CALTRATE 600+D PO)    Cholecalciferol 1000 units tablet    escitalopram (LEXAPRO) 10 mg tablet    gabapentin (NEURONTIN) 100 mg capsule    levothyroxine 125 mcg tablet    Resveratrol-Quercetin 100-100 MG TABS    valsartan (DIOVAN) 160 mg tablet     Allergies   Allergen Reactions    Loratadine-Pseudoephedrine Er Tinnitus       Objective   Vitals: Blood pressure 135/65, pulse (!) 52, temperature (!) 97 3 °F (36 3 °C), temperature source Oral, resp  rate 16, height 5' 8" (1 727 m), weight 66 7 kg (147 lb), SpO2 94 %  Physical Exam   Constitutional: Pt is oriented to person, place, and time  HENT:   Head: Normocephalic  Eyes: EOM are normal  Pupils are equal, round, and reactive to light  Neck: Neck supple  Cardiovascular: Normal rate and regular rhythm  No murmur heard  Pulmonary/Chest: Breath sounds normal  No respiratory distress  Pt has no wheezes  Pt has no rales  Abdominal: Soft  Bowel sounds are normal  Pt exhibits no distension  There is no tenderness  There is no rebound and no guarding  Musculoskeletal: No edema  Rt UE in immobilizer    Neurological: Pt is alert and oriented to person, place, and time  Psychiatric: Pt has a normal mood and affect  Lab Results:                   No results found for: GLUCOSE    Labs reviewed    Imaging: reviewed  EKG, Pathology, and Other Studies: I have personally reviewed pertinent reports  VTE Prophylaxis: Sequential compression device Harika Chavarria)     Code Status: Level 1 - Full Code   Advance Directive and Living Will:      Power of :    POLST:      Assessment/Plan     1  Rt shoulder glenohumeral osteoarthritis s/p Rt TSA: Continue post op pain control measures as prescribed  Follow bowel regimen to help decrease narcotic induced constipation  Follow post operative hemoglobin with serial CBC and treat accordingly  Monitor WBC and fever curve post op while encouraging use of incentive spirometer  DVT prophylaxis in place and reviewed  2  HTN: Monitor BP every shift  Hold Diovan in the post-op period  Add Hydralazine for SBP > 160  Resume Diovan at DC  3  Hypothyroidism: Continue Synthroid  Counseling / Coordination of Care  Total floor / unit time spent today 20 minutes  Greater than 50% of total time was spent with the patient and / or family counseling and / or coordination of care        Christo Chapman PA-C

## 2018-05-01 NOTE — ANESTHESIA PREPROCEDURE EVALUATION
Review of Systems/Medical History          Cardiovascular  Hypertension ,    Pulmonary       GI/Hepatic            Endo/Other  History of thyroid disease ,      GYN       Hematology   Musculoskeletal    Arthritis     Neurology   Psychology   Depression ,              Physical Exam    Airway    Mallampati score: II         Dental   No notable dental hx     Cardiovascular      Pulmonary      Other Findings        Anesthesia Plan  ASA Score- 2     Anesthesia Type- general with ASA Monitors  Additional Monitors:   Airway Plan: ETT  Comment: I, Dr Coty Olivares, the attending physician, have personally seen and evaluated the patient prior to anesthetic care  I have reviewed the pre-anesthetic record, and other medical records if appropriate to the anesthetic care  If a CRNA is involved in the case, I have reviewed the CRNA assessment, if present, and agree  The patient is in a suitable condition to proceed with my formulated anesthetic plan        Plan Factors-    Induction- intravenous  Postoperative Plan-     Informed Consent- Anesthetic plan and risks discussed with patient  I personally reviewed this patient with the CRNA  Discussed and agreed on the Anesthesia Plan with the CRNA  Kiki Walker

## 2018-05-01 NOTE — OP NOTE
OPERATIVE REPORT  PATIENT NAME: Carrie Duarte    :  1950  MRN: [id number]  Pt Location:  OR ROOM 15    SURGERY DATE: 2018     SURGEON: Dorene Franklin MD     ASSISTANT: Carmen Kearns PA-C     NOTE: Carmen Kearns PA-C was present throughout the entire procedure and performed essential assistance with patient prepping, draping, positioning, suture management, wound closure, sterile dressing application and sling application, all under my direct supervision  RESIDENT ASSITANT: Nelly Pinzon MD PGY-2 Assisted in the procedure  Tania Medina MD, was present for the entire procedure and was scrubbed for and performed all the key and essential components of the procedure  PREOPERATIVE DIAGNOSIS:  Right Shoulder Glenohumeral Osteoarthritis    POSTOPERATIVE DIAGNOSIS: Right Shoulder Glenohumeral Osteoarthritis    PROCEDURES: Right Total Shoulder Arthroplasty with Long Head Biceps Tenodesis    ANESTHESIA STAFF: Lucian Higginbotham MD     ANESTHESIA TYPE: General with endotracheal tube with interscalene block and catheter placement    COMPLICATIONS: None    FINDINGS: Glenohumeral Osteoarthritis with B2 Glenoid    SPECIMEN(S): None    ESTIMATED BLOOD LOSS: 90 mL    INDICATIONS FOR PROCEDURE:  The patient is a 70 y o  female presenting with pain and lack of function secondary to glenohumeral osteoarthritis of the right shoulder  After a thorough discussion of the risks and benefits of operative and nonoperative care the patient elected for right total shoulder arthroplasty  Informed consent was obtained in the office  OPERATIVE TECHNIQUE:  The day of surgery I identified the patient's right shoulder and marked it with my initials  The patient was taken back to the operating room where endotracheal tube with interscalene block and catheter placement  was placed by the anesthesia staff without complication   The patient was placed in the beachchair position with all bony prominences padded  The right shoulder was prepped and draped in routine sterile fashion and after a time-out for safety and confirming 2 grams of IV Cefazolin were given, a standard deltopectoral approach was performed  The dissection was carried down to the subscapularis and a subscapularis peel was preformed  The long head of biceps had severe tenosynovitis and degeneration, so it was released and tagged for later tenodesis to the anterior humerus at the end of the case  The humeral head was exposed and found to have severe degenerative change with an intact rotator cuff  The humerus was prepared keeping with the surgical technique for a Tornier Flex short stem prosthesis  After preparing the humerus the glenoid was exposed and found to have B2 wear pattern  The glenoid was  prepared for a Tornier Perform Plus glenoid size small, 15 degree augment  After confirming appropriate size and version the final glenoid component was press fit centrally and peripheral pegs were cemented  The humerus was then finished and a size 3B stem with a 48 mm high-eccentric head was trialed and found to have excellent stability with full range of motion and appropriate soft tissue tension  Final implants were placed and the area was irrigated with pulse lavage  The subscapularis was repaired to the anterior humerus and the long head biceps was tenodesed to the anterior humerus with Orthocord sutures  The deltopectoral interval was loosely closed with 0 Vicryl and the subdermal layer with 2-0 Vicryl with staples for skin  Sterile dressings and a sling with abduction pillow was placed and the patient was awoken  The patient was transported to the recovery room in good condition and will be admitted for post-operative care and physical therapy will be initiated following the standard total shoulder arthroplasty protocol      SIGNATURE: Garrick Humphrey MD  DATE: May 1, 2018  TIME: 9:21 AM

## 2018-05-02 VITALS
TEMPERATURE: 97.5 F | HEART RATE: 62 BPM | HEIGHT: 68 IN | OXYGEN SATURATION: 97 % | WEIGHT: 147 LBS | RESPIRATION RATE: 18 BRPM | BODY MASS INDEX: 22.28 KG/M2 | SYSTOLIC BLOOD PRESSURE: 157 MMHG | DIASTOLIC BLOOD PRESSURE: 75 MMHG

## 2018-05-02 PROBLEM — M19.011 PRIMARY OSTEOARTHRITIS OF RIGHT SHOULDER: Status: RESOLVED | Noted: 2018-02-12 | Resolved: 2018-05-02

## 2018-05-02 PROBLEM — Z96.611 STATUS POST TOTAL REPLACEMENT OF RIGHT SHOULDER: Status: ACTIVE | Noted: 2018-05-02

## 2018-05-02 LAB
ANION GAP SERPL CALCULATED.3IONS-SCNC: 7 MMOL/L (ref 4–13)
BUN SERPL-MCNC: 12 MG/DL (ref 5–25)
CALCIUM SERPL-MCNC: 8.5 MG/DL (ref 8.3–10.1)
CHLORIDE SERPL-SCNC: 101 MMOL/L (ref 100–108)
CO2 SERPL-SCNC: 28 MMOL/L (ref 21–32)
CREAT SERPL-MCNC: 0.62 MG/DL (ref 0.6–1.3)
GFR SERPL CREATININE-BSD FRML MDRD: 92 ML/MIN/1.73SQ M
GLUCOSE SERPL-MCNC: 115 MG/DL (ref 65–140)
POTASSIUM SERPL-SCNC: 4.3 MMOL/L (ref 3.5–5.3)
SODIUM SERPL-SCNC: 136 MMOL/L (ref 136–145)

## 2018-05-02 PROCEDURE — 99024 POSTOP FOLLOW-UP VISIT: CPT | Performed by: ORTHOPAEDIC SURGERY

## 2018-05-02 PROCEDURE — G8988 SELF CARE GOAL STATUS: HCPCS

## 2018-05-02 PROCEDURE — 97535 SELF CARE MNGMENT TRAINING: CPT

## 2018-05-02 PROCEDURE — 97166 OT EVAL MOD COMPLEX 45 MIN: CPT

## 2018-05-02 PROCEDURE — G8987 SELF CARE CURRENT STATUS: HCPCS

## 2018-05-02 PROCEDURE — G8978 MOBILITY CURRENT STATUS: HCPCS

## 2018-05-02 PROCEDURE — 80048 BASIC METABOLIC PNL TOTAL CA: CPT | Performed by: ORTHOPAEDIC SURGERY

## 2018-05-02 PROCEDURE — G8979 MOBILITY GOAL STATUS: HCPCS

## 2018-05-02 PROCEDURE — G8989 SELF CARE D/C STATUS: HCPCS

## 2018-05-02 PROCEDURE — 97163 PT EVAL HIGH COMPLEX 45 MIN: CPT

## 2018-05-02 RX ORDER — DOCUSATE SODIUM 100 MG/1
100 CAPSULE, LIQUID FILLED ORAL 2 TIMES DAILY
Qty: 20 CAPSULE | Refills: 0 | Status: SHIPPED | OUTPATIENT
Start: 2018-05-02 | End: 2019-07-25

## 2018-05-02 RX ADMIN — CEFAZOLIN SODIUM 1000 MG: 10 INJECTION, POWDER, FOR SOLUTION INTRAVENOUS at 02:28

## 2018-05-02 RX ADMIN — DOCUSATE SODIUM 100 MG: 100 CAPSULE, LIQUID FILLED ORAL at 08:38

## 2018-05-02 RX ADMIN — ACETAMINOPHEN 650 MG: 325 TABLET, FILM COATED ORAL at 05:48

## 2018-05-02 RX ADMIN — SENNOSIDES 8.6 MG: 8.6 TABLET ORAL at 08:38

## 2018-05-02 RX ADMIN — OXYCODONE HYDROCHLORIDE 5 MG: 5 TABLET ORAL at 11:31

## 2018-05-02 RX ADMIN — LEVOTHYROXINE SODIUM 125 MCG: 125 TABLET ORAL at 05:48

## 2018-05-02 NOTE — SOCIAL WORK
Met with pt and hsb at bedside to explain CM role  Pt resides with her hsb in a home with 1 [address] and bedroom on 2nd floor  Pt is indep with all ADLs  Pt plans on going for OP therapy  Pt does not have POA or living will  Pts hsb Vikki Pao is primary contact [phone]  Pt denies substance abuse and mental illness  Pt states her PCP is Dr Nancy Rowan and her RX is [facility] on [address]       CM reviewed d/c planning process including the following: identifying help at home, patient preference for d/c planning needs, Discharge Lounge, [facility] Meds to Bed program, availability of treatment team to discuss questions or concerns patient and/or family may have regarding understanding medications and recognizing signs and symptoms once discharged  CM also encouraged patient to follow up with all recommended appointments after discharge  Patient advised of importance for patient and family to participate in managing patients medical well being  Patient/caregiver received discharge checklist  Content reviewed  Patient/caregiver encouraged to participate in discharge plan of care prior to discharge home

## 2018-05-02 NOTE — PLAN OF CARE
Problem: PHYSICAL THERAPY ADULT  Goal: Performs mobility at highest level of function for planned discharge setting  See evaluation for individualized goals  Treatment/Interventions: Functional transfer training, LE strengthening/ROM, Therapeutic exercise, Endurance training, Equipment eval/education, Bed mobility, Gait training, Compensatory technique education, Spoke to nursing, Spoke to case management, OT          See flowsheet documentation for full assessment, interventions and recommendations  Prognosis: Good  Problem List: Decreased strength, Decreased range of motion, Decreased endurance, Impaired balance, Decreased mobility, Decreased coordination, Decreased safety awareness, Decreased skin integrity, Orthopedic restrictions, Pain  Assessment: Pt is a 70year old female being seen status post reverse total shoulder arthroplasty  Pt received in room, supine in bed, HOB elevated, pt agreeable to therapy session  Pt performs supine to sit transfer with contact guard assist for support of shoulder  Pt performs sit to stand transfer with supervision level of assistance and verbal cues for safety  Pt amb 60 ft x 2 NWB RUE with abduction sling donned with supervision level of assistance and verbal cues for sequencing and safety  Pt displays narrow base of support, guarded posture, and short step and stride length with no LOB noted  Pt ascended and descended 5 stairs with L handrail and supervision level of assistance and verbal cues for safety  Educated pt on positioning of upper extremity and range of motion exercises as per doctors orders; instructed pt in sling hygiene  PTA pt was living at home independently with her   Pt presents with high medical complexity - multiple lines, cont pulse ox monitoring, complex PMH including cancer, regression from baseline level of function   Skilled physical therapy is indicated to address the following functional deficits - decreased strength, decreased endurance, impaired balance, gait impairments, decline in functional mobility  Recommend pt discharge to home with family support and outpatient physical therapy when medically stable  Recommendation: Home with family support, Outpatient PT          See flowsheet documentation for full assessment

## 2018-05-02 NOTE — DISCHARGE SUMMARY
ORTHOPEDICS DISCHARGE SUMMARY  Velia May 70 y o  female MRN: [id number]  Unit/Bed#: FW4 337-01    Attending Physician: Scott Stauffer    Admitting diagnosis: Primary osteoarthritis of right shoulder [M19 011]    Discharge diagnosis: Primary osteoarthritis of right shoulder [M19 011]    Date of admission: 5/1/2018    Date of discharge: 05/02/18         Procedure: Right total shoulder arthroplasty    HPI:  This is a 70y o  year old female that presented to the office with signs and symptoms of right Shoulder osteoarthritis and/or other pathology  They tried and failed conservative treatment measures and wished to proceed with surgical intervention  The risks, benefits, and complications of the procedure were discussed with the patient and informed consent was obtained  Hospital Course: The patient was admitted to the hospital on 5/1/2018 and underwent an uncomplicated right total shoulder arthroplasty  They were transferred to the floor after a brief stay in the post-anesthesia care unit  Their pain was well managed with IV and oral pain medications  On discharge date pt was cleared by PT and the medicine team and determined to be safe for discharge    No results found for: HGB      Discharge Instructions: The patient was discharged nonweight bearing to the right upper extremity  Refrain from PT/OT until cleared by Surgeon  Take pain medications as instructed  Discharge Medications: For the complete list of discharge medications, please refer to the patient's medication reconciliation

## 2018-05-02 NOTE — PLAN OF CARE
Problem: OCCUPATIONAL THERAPY ADULT  Goal: Performs self-care activities at highest level of function for planned discharge setting  See evaluation for individualized goals  Treatment Interventions: ADL retraining, Patient/family training, Compensatory technique education, Equipment evaluation/education, Activityengagement          See flowsheet documentation for full assessment, interventions and recommendations  Outcome: Adequate for Discharge  Limitation: Decreased ADL status, Decreased self-care trans, Decreased high-level ADLs  Prognosis: Good  Assessment: Pt is a 69 y/o female seen for OT eval s/p adm to [facility] w/ Right Shoulder Glenohumeral Osteoarthritis receiving Right Total Shoulder Arthroplasty with Long Head Biceps Tenodesis on 5/1/18  Comorbidities include a h/o HTN, OA, depression and thyroid CA  Pt with active OT orders, NWB R UE and activity as tolerated orders  Pt lives with her  in a 2 story home  Pt was I w/ ADLS and IADLS, drove, & required no use of DME PTA  Pt is currently demonstrating the following occupational deficits: mod A UB ADLS, min A LB ADLS, S functional transfers and S functional mobility using rw  Pt with deficits and limitations in all baseline areas of occupation 2*  significant pain, decreased endurance/activity tolerance, decreased functional forward reach, decreased ADL status, impaired balance, decreased mobility status, WBS, and orthopedic restrictions  The following Occupational Performance Areas to address include: bathing/shower, dressing, functional mobility, community mobility, clothing management, meal prep and household maintenance  Pt scored overall 65/100 on the Barthel Index  Based on the aforementioned OT evaluation, functional performance deficits, and assessments, pt has been identified as a moderate complexity evaluation  Recommend home with family support upon D/C   Pt to continue to benefit from acute immediate OT services to address the following goals 1-2 sessions to  w/in 3-5 days:     OT Discharge Recommendation: Home with family support  OT - OK to Discharge: Yes      Pt participated in OT tx session focusing on UB/LB ADLS, abduction sling management, and pt education regarding ADLs in the home, activity engagement and safety  Pt performed LB dressing of pants, underpants, socks and shoes while seated in chair w/ S  Pt able to thread B/L LE into pants and able to pull up pants over hips in standing  Pt performed UB dressing of back close bra and front open shirt w/ min A requiring A to thread R UE, and fasten bra and button up shirt  Pt educated on donning/doffing abductions sling and able to perform w/ min A  Educated pt regarding safety in the home, energy conservation and activity engagement  Pt verbalizes understanding on all educated materials  Pt with no concerns for D/C and all questions answered at this time  Pt no longer requires acute care skilled OT services  Recommend pt participate in ADLs and ambulate hallways with non-OT staff  Please re-consult if changes occur  D/C OT         Marc See, MS, OTR/L

## 2018-05-02 NOTE — CASE MANAGEMENT
Initial Clinical Review    Age/Sex: 70 y o  female admitted on 5/1 for elective surgery - OR    Surgery Date: 5/1    Procedure: S/P Total Shoulder Arthroplasty (Right Shoulder)    Anesthesia: General    Admission Orders: Date/Time/Statement: Inpatient 5/1/18 @ [facility]  Med Surg     Orders Placed This Encounter   Procedures    Inpatient Admission     Standing Status:   Standing     Number of Occurrences:   1     Order Specific Question:   Admitting Physician     Answer:   Thuan Guardado     Order Specific Question:   Level of Care     Answer:   Med Surg [16]     Order Specific Question:   Estimated length of stay     Answer:   Inpatient Only Surgery       Vital Signs: /75 (BP Location: Left arm)   Pulse 62   Temp 97 5 °F (36 4 °C) (Oral)   Resp 18   Ht 5' 8" (1 727 m)   Wt 66 7 kg (147 lb)   SpO2 97%   BMI 22 35 kg/m²     Diet:        Diet Orders            Start     Ordered    05/01/18 1133  Diet Regular; Regular House  Diet effective now     Question Answer Comment   Diet Type Regular    Regular Regular House    RD to adjust diet per protocol?  Yes        05/01/18 1132          Mobility: OOB  PT eval and treat    DVT Prophylaxis: Sequential compression device    Scheduled Meds:  Current Facility-Administered Medications:  Cefazolin  Intravenous x3   docusate sodium 100 mg Oral BID   escitalopram 10 mg Oral HS   gabapentin 100 mg Oral HS   levothyroxine 125 mcg Oral Early Morning   senna 1 tablet Oral Daily     Continuous Infusions:  lactated ringers 125 mL/hr Last Rate: Stopped (05/01/18 1800)   ropivacaine (Ambu ACTion BLOCK PUMP) continuous peripheral nerve block  Last Rate: 10 mL/hr at 05/01/18 0950     PRN Meds:    Acetaminophen po x3    calcium carbonate    hydrALAZINE    morphine injection    ondansetron    oxyCODONE po x2

## 2018-05-02 NOTE — PHYSICAL THERAPY NOTE
PHYSICAL THERAPY EVALUATION        Patient Name: Erin Aiken  ELHBR'G Date: 5/2/2018 05/02/18 0815   Pain Assessment   Pain Assessment 0-10   Pain Score 3   Pain Type Acute pain;Surgical pain   Pain Location Shoulder   Pain Orientation Right   Hospital Pain Intervention(s) Repositioned; Ambulation/increased activity   Response to Interventions unchanged   Home Living   Type of [address] Multi-level;Stairs to enter with rails   Prior Function   Level of [place] Independent with ADLs and functional mobility   Lives With Spouse   Receives Help From Family   Restrictions/Precautions   Weight Bearing Precautions Per Order Yes   RUE Weight Bearing Per Order NWB   Braces or Orthoses Sling  (abduction sling)   Other Precautions WBS; Multiple lines; Fall Risk;Pain   General   Family/Caregiver Present No   Cognition   Overall Cognitive Status WFL   Orientation Level Oriented X4   Following Commands Follows multistep commands with increased time or repetition   RUE Assessment   RUE Assessment (not used secondary to NWB status, abduction sling donned )   LUE Assessment   LUE Assessment (forward reach and grasp)   RLE Assessment   RLE Assessment X   Strength RLE   RLE Overall Strength 4/5   LLE Assessment   LLE Assessment X   Strength LLE   LLE Overall Strength 4/5   Coordination   Movements are Fluid and Coordinated 0   Coordination and Movement Description slow movements, guarded   Bed Mobility   Supine to Sit 4  Minimal assistance  (CGA for support of shoulder)   Additional items Increased time required;Verbal cues   Transfers   Sit to Stand 5  Supervision   Additional items Increased time required;Verbal cues   Stand to Sit 5  Supervision   Additional items Increased time required;Verbal cues   Stand pivot 5  Supervision   Additional items Increased time required;Verbal cues   Ambulation/Elevation   Gait pattern Improper Weight shift;Narrow RICH; Decreased foot clearance; Inconsistent antonino; Short stride   Gait Assistance 5  Supervision   Additional items Verbal cues   Assistive Device None   Distance 60 ft x 2   Stair Management Assistance 5  Supervision   Additional items Verbal cues   Stair Management Technique One rail L;Alternating pattern; Foreward   Number of Stairs 5   Balance   Static Sitting Good   Dynamic Sitting Fair +  (forward reach)   Static Standing Fair +   Dynamic Standing Fair   Ambulatory Fair   Endurance Deficit   Endurance Deficit Yes   Endurance Deficit Description fatigue, pain   Activity Tolerance   Activity Tolerance Patient tolerated treatment well   Medical Staff Made Aware OT, [facility]   Nurse Made Aware yes, Melanie   Assessment   Prognosis Good   Problem List Decreased strength;Decreased range of motion;Decreased endurance; Impaired balance;Decreased mobility; Decreased coordination;Decreased safety awareness;Decreased skin integrity;Orthopedic restrictions;Pain   Assessment Pt is a 70year old female being seen status post reverse total shoulder arthroplasty  Pt received in room, supine in bed, HOB elevated, pt agreeable to therapy session  Pt performs supine to sit transfer with contact guard assist for support of shoulder  Pt performs sit to stand transfer with supervision level of assistance and verbal cues for safety  Pt amb 60 ft x 2 NWB RUE with abduction sling donned with supervision level of assistance and verbal cues for sequencing and safety  Pt displays narrow base of support, guarded posture, and short step and stride length with no LOB noted  Pt ascended and descended 5 stairs with L handrail and supervision level of assistance and verbal cues for safety  Educated pt on positioning of upper extremity and range of motion exercises as per doctors orders; instructed pt in sling hygiene  PTA pt was living at home independently with her    Pt presents with high medical complexity - multiple lines, cont pulse ox monitoring, complex PMH including cancer, regression from baseline level of function  Skilled physical therapy is indicated to address the following functional deficits - decreased strength, decreased endurance, impaired balance, gait impairments, decline in functional mobility  Recommend pt discharge to home with family support and outpatient physical therapy when medically stable  Goals   Patient Goals none expressed   STG Expiration Date 05/16/18   Short Term Goal #1 1  Pt will increased strength by 1 grade in order to improve transfer ability  2  Pt will increase balance grade by 1 in order to improve safety with household and community mobility  3  Pt will improve transfer ability to mod I to increase functional independence  4  Pt will be able to amb 150 ft independently to facilitate safe community mobility  5  Pt will be able to ascend and descend 12 stairs with no handrails independently to increase functional independence with home mobility  6  Pt will be independent with donning and doffing abduction sling to decrease caregiver burden and increase safety at home  7  Pt will be independent with home exercis program    Treatment Day 0   Plan   Treatment/Interventions Functional transfer training;LE strengthening/ROM; Therapeutic exercise; Endurance training;Equipment eval/education; Bed mobility;Gait training; Compensatory technique education;Spoke to nursing;Spoke to case management;OT   PT Frequency Twice a day   Recommendation   Recommendation Home with family support; Outpatient PT   Barthel Index   Feeding 5   Bathing 0   Grooming Score 0   Dressing Score 5   Bladder Score 10   Bowels Score 10   Toilet Use Score 5   Transfers (Bed/Chair) Score 10   Mobility (Level Surface) Score 0   Stairs Score 5   Barthel Index Score 50

## 2018-05-02 NOTE — PROGRESS NOTES
Orthopedics   Gera Andrews 70 y o  female MRN: [id number]  Unit/Bed#: CW3 337-01      Subjective:  70 y  o female post operative day 1 right total shoulder arthroplasty  Patient doing well  Pain controlled      Labs:  No results found for: HCT, HGB, PT, INR, WBC, ESR, CRP    Meds:    Current Facility-Administered Medications:     acetaminophen (TYLENOL) tablet 650 mg, 650 mg, Oral, Q6H PRN, Kassandra Cortes MD, 650 mg at 05/02/18 0548    calcium carbonate (TUMS) chewable tablet 1,000 mg, 1,000 mg, Oral, Daily PRN, Kassandra Cortes MD    ceFAZolin (ANCEF) 1,000 mg in sodium chloride 0 9 % 50 mL IVPB, 1,000 mg, Intravenous, Q8H, Kassandra Cortes MD, Last Rate: 100 mL/hr at 05/02/18 0228, 1,000 mg at 05/02/18 0228    docusate sodium (COLACE) capsule 100 mg, 100 mg, Oral, BID, Kassandra Cortes MD, 100 mg at 05/01/18 1815    escitalopram (LEXAPRO) tablet 10 mg, 10 mg, Oral, HS, Kassandra Cortes MD, 10 mg at 05/01/18 2124    gabapentin (NEURONTIN) capsule 100 mg, 100 mg, Oral, HS, Kassandra Cortes MD, 100 mg at 05/01/18 2124    hydrALAZINE (APRESOLINE) tablet 25 mg, 25 mg, Oral, Q8H PRN, Evelyn Cassidy PA-C    lactated ringers infusion, 125 mL/hr, Intravenous, Continuous, Ankit Clark MD, Stopped at 05/01/18 1800    levothyroxine tablet 125 mcg, 125 mcg, Oral, Early Morning, Kassandra Cortes MD, 125 mcg at 05/02/18 0548    morphine injection 2 mg, 2 mg, Intravenous, Q1H PRN, Kassandra Cortes MD    ondansetron [facility]) injection 4 mg, 4 mg, Intravenous, Q6H PRN, Kassandra Cortes MD    oxyCODONE (ROXICODONE) immediate release tablet 10 mg, 10 mg, Oral, Q4H PRN, Kassandra Cortes MD, 10 mg at 05/01/18 1815    oxyCODONE (ROXICODONE) IR tablet 5 mg, 5 mg, Oral, Q4H PRN, Kassandra Cortes MD    ropivacaine (NAROPIN) 0 2 % 600 mL in elastomeric reservoir continuous peripheral nerve block, , Subcutaneous, Continuous, Andrey Youngblood MD, Last Rate: 10 mL/hr at 05/01/18 0936    senna ([address]) tablet 8 6 mg, 1 tablet, Oral, Daily, Ana Fuller MD, 8 6 mg at 05/01/18 1228    Blood Culture:   No results found for: BLOODCX    Wound Culture:   No results found for: WOUNDCULT    Ins and Outs:  I/O last 24 hours: In: 3346 3 [P O :640; I V :2656 3; IV Piggyback:50]  Out: 1740 [Urine:1650; Blood:90]          Physical:  Vitals:    05/02/18 0247   BP: 151/74   Pulse: 64   Resp: 18   Temp: 98 2 °F (36 8 °C)   SpO2: 95%     right upper extremity  · Dressings clean dry intact  · Sensation intact to axillary, musculocutaneous, radial, ulna, median nerves  · Motor intact to axillary, musculocutaneous, radial, ulna, median nerves  · 2+ Radial pulse    _*_*_*_*_*_*_*_*_*_*_*_*_*_*_*_*_*_*_*_*_*_*_*_*_*_*_*_*_*_*_*_*_*_*_*_*_*_*_*_*_*    Assessment: 70 y  o female post operative day 1 right total shoulder arthroplasty   Doing well    Plan:  · Nonweight Bearing right upper extremity  · Up and out of bed  · Sling for Comfort, may remove for pendulums and hygiene  · DVT prophylaxis  · Ice and analgesics  · Will continue to assess for acute blood loss anemia      Renetta Evans MD

## 2018-05-02 NOTE — OCCUPATIONAL THERAPY NOTE
[address]  Evaluation     Patient Name: Janet Thmoas  GGOTX'U Date: 5/2/2018  Problem List  Patient Active Problem List   Diagnosis    Status post total replacement of right shoulder     Past Medical History  Past Medical History:   Diagnosis Date    Cancer ([address] )     thyroid    Depression     Disease of thyroid gland     Fractures     Hypertension     Osteoarthritis     Vascular disorder      Past Surgical History  Past Surgical History:   Procedure Laterality Date    COLONOSCOPY      THYROID SURGERY      TONSILLECTOMY      TOOTH EXTRACTION      TUBAL LIGATION      VEIN LIGATION AND STRIPPING        05/02/18 0914   Note Type   Note type Eval/Treat   Restrictions/Precautions   Weight Bearing Precautions Per Order Yes   RUE Weight Bearing Per Order NWB   Braces or Orthoses Sling  (abduction )   Other Precautions WBS; Multiple lines;Telemetry; Fall Risk;Pain   Pain Assessment   Pain Assessment 0-10   Pain Score 1   Pain Type Surgical pain   Pain Location Shoulder   Pain Orientation Right   Hospital Pain Intervention(s) Ambulation/increased activity;Repositioned   Response to Interventions tolerated   Home Living   Type of Home House   Home Layout Two level   Bathroom Shower/Tub Walk-in shower   Bathroom Toilet Standard   Bathroom Equipment Shower chair   Bathroom Accessibility Accessible   Additional Comments Pt lives with her  in a 2 story home  Prior Function   Level of [place] Independent with ADLs and functional mobility   Lives With Spouse   Receives Help From Family   ADL Assistance Independent   IADLs Independent   Falls in the last 6 months 0   Vocational Retired   Comments Pt was I w/ ADLS and IADLS, drove, & required no use of DME PTA     Lifestyle   Autonomy Pt was I w/ ADLS/IADLS PTA    Reciprocal Relationships Pt lives with her  who she reports is able to provide A upon D/C   Service to Others Pt is retired   Intrinsic Gratification Pt active PTA Psychosocial   Psychosocial (WDL) WDL   ADL   Eating Assistance 5  Supervision/Setup   Grooming Assistance 5  Supervision/Setup   UB Bathing Assistance 3  Moderate Assistance   LB Bathing Assistance 4  Minimal Assistance   UB Dressing Assistance 3  Moderate Assistance   LB Dressing Assistance 4  Minimal Assistance   Toileting Assistance  4  Minimal Assistance   Bed Mobility   Supine to Sit Unable to assess   Sit to Supine Unable to assess   Additional Comments Pt OOB upon arrival   Transfers   Sit to Stand 5  Supervision   Additional items Increased time required;Verbal cues   Stand to Sit 5  Supervision   Additional items Increased time required;Verbal cues   Toilet transfer 5  Supervision   Additional items Increased time required;Verbal cues; Commode   Functional Mobility   Functional Mobility 5  Supervision   Additional Comments without AD   Balance   Static Sitting Normal   Dynamic Sitting Good   Static Standing Good   Dynamic Standing Fair +   Ambulatory Fair +   Activity Tolerance   Activity Tolerance Patient tolerated treatment well   Nurse Made Aware yes   RUE Assessment   RUE Assessment X   LUE Assessment   LUE Assessment WFL   Hand Function   Gross Motor Coordination Functional   Fine Motor Coordination Functional   Cognition   Overall Cognitive Status WFL   Arousal/Participation Responsive; Alert; Cooperative   Attention Within functional limits   Memory Within functional limits   Following Commands Follows all commands and directions without difficulty   Assessment   Limitation Decreased ADL status; Decreased self-care trans;Decreased high-level ADLs   Prognosis Good   Assessment Pt is a 69 y/o female seen for OT eval s/p adm to [facility] w/ Right Shoulder Glenohumeral Osteoarthritis receiving Right Total Shoulder Arthroplasty with Long Head Biceps Tenodesis on 5/1/18  Comorbidities include a h/o HTN, OA, depression and thyroid CA  Pt with active OT orders, NWB R UE and activity as tolerated orders   Pt lives with her  in a 2 story home  Pt was I w/ ADLS and IADLS, drove, & required no use of DME PTA  Pt is currently demonstrating the following occupational deficits: mod A UB ADLS, min A LB ADLS, S functional transfers and S functional mobility using rw  Pt with deficits and limitations in all baseline areas of occupation 2*  significant pain, decreased endurance/activity tolerance, decreased functional forward reach, decreased ADL status, impaired balance, decreased mobility status, WBS, and orthopedic restrictions  The following Occupational Performance Areas to address include: bathing/shower, dressing, functional mobility, community mobility, clothing management, meal prep and household maintenance  Pt scored overall 65/100 on the Barthel Index  Based on the aforementioned OT evaluation, functional performance deficits, and assessments, pt has been identified as a moderate complexity evaluation  Recommend home with family support upon D/C  Pt to continue to benefit from acute immediate OT services to address the following goals 1-2 sessions to  w/in 3-5 days:   Goals   Patient Goals to go home   STG Time Frame 3-5   ADL Goals   Pt Will Perform UE Dressing With min assist;With assist to don/doff splints/brace/orthosis   Pt Will Perform LE Dressing With stand by assist;In chair; With setup   Pt Will Perform Toileting With stand by assist   Plan   Treatment Interventions ADL retraining;Patient/family training; Compensatory technique education;Equipment evaluation/education; Activityengagement   Goal Expiration Date 18   OT Frequency (1-2 sessions )   Additional Treatment Session   Start Time 857   End Time 914   Treatment Assessment Pt participated in OT tx session focusing on UB/LB ADLS, abduction sling management, and pt education regarding ADLs in the home, activity engagement and safety  Pt performed LB dressing of pants, underpants, socks and shoes while seated in chair w/ S   Pt able to thread B/L LE into pants and able to pull up pants over hips in standing  Pt performed UB dressing of back close bra and front open shirt w/ min A requiring A to thread R UE, and fasten bra and button up shirt  Pt educated on donning/doffing abductions sling and able to perform w/ min A  Educated pt regarding safety in the home, energy conservation and activity engagement  Pt verbalizes understanding on all educated materials  Pt with no concerns for D/C and all questions answered at this time  Pt no longer requires acute care skilled OT services  Recommend pt participate in ADLs and ambulate hallways with non-OT staff  Please re-consult if changes occur  D/C OT      Additional Treatment Day 1   Recommendation   OT Discharge Recommendation Home with family support   OT - OK to Discharge Yes   Barthel Index   Feeding 10   Bathing 0   Grooming Score 5   Dressing Score 5   Bladder Score 10   Bowels Score 10   Toilet Use Score 10   Transfers (Bed/Chair) Score 15   Mobility (Level Surface) Score 0  (less than 50)   Stairs Score 0  (not tested)   Barthel Index Score 65   Modified Storey Scale   Modified Tonny Scale 2       Esthela Melendez MS, OTR/L

## 2018-05-04 ENCOUNTER — OFFICE VISIT (OUTPATIENT)
Dept: PHYSICAL THERAPY | Facility: REHABILITATION | Age: 68
End: 2018-05-04
Payer: COMMERCIAL

## 2018-05-04 DIAGNOSIS — M19.011 PRIMARY OSTEOARTHRITIS OF RIGHT SHOULDER: ICD-10-CM

## 2018-05-04 DIAGNOSIS — Z96.611 H/O TOTAL SHOULDER REPLACEMENT, RIGHT: Primary | ICD-10-CM

## 2018-05-04 PROCEDURE — G8990 OTHER PT/OT CURRENT STATUS: HCPCS | Performed by: PHYSICAL THERAPIST

## 2018-05-04 PROCEDURE — 97140 MANUAL THERAPY 1/> REGIONS: CPT | Performed by: PHYSICAL THERAPIST

## 2018-05-04 PROCEDURE — G8991 OTHER PT/OT GOAL STATUS: HCPCS | Performed by: PHYSICAL THERAPIST

## 2018-05-04 PROCEDURE — 97110 THERAPEUTIC EXERCISES: CPT | Performed by: PHYSICAL THERAPIST

## 2018-05-04 NOTE — PROGRESS NOTES
Daily Note     Today's date: 2018  Patient name: Helene Dukes  : 1950  MRN: [id number]  Referring provider: Mary Figueroa MD  Dx:   Encounter Diagnosis     ICD-10-CM    1  H/O total shoulder replacement, right Z96 611    2  Primary osteoarthritis of right shoulder M19 011                   Subjective: Pt underwent R TSA on 90 without complications  Pt was d/c home Wednesday and is cleared to begin PT following standard TSA protocol  Pt reports compliance with sling use  Pain 6/10 at worse and 0/10 at best, noting relief with medication and ice  Objective: See treatment diary below  Incision C/D/I with staples intact; dressing changed this session  Manual   /                     R shoulder PROM flexion, ER to limit set by MD (elbow at side), Ir, Cross body adduction; elbow PROM  15'                                                                                                                           Exercise Diary                        Pendulums a/p, m/l  1 5'ea                     scap squeeze  5"x10                                            Wrist AROM  20ea                     digiflex  red x2'                     C/S AROM (SB+rot) 10"x5ea                                                                                                                                                                                                                                                                                                                                                                           Modalities   5/                     CP PRN  np                                                                           Assessment: Tolerated treatment well  VC's required for correct technique with TE's and to avoid muscle guarding with PROM  No increased pain post session  Monitor response NV    Patient would benefit from continued PT      Plan: Continue per plan of care

## 2018-05-07 ENCOUNTER — OFFICE VISIT (OUTPATIENT)
Dept: PHYSICAL THERAPY | Facility: REHABILITATION | Age: 68
End: 2018-05-07
Payer: COMMERCIAL

## 2018-05-07 DIAGNOSIS — M19.011 PRIMARY OSTEOARTHRITIS OF RIGHT SHOULDER: ICD-10-CM

## 2018-05-07 DIAGNOSIS — Z96.611 H/O TOTAL SHOULDER REPLACEMENT, RIGHT: Primary | ICD-10-CM

## 2018-05-07 PROCEDURE — 97140 MANUAL THERAPY 1/> REGIONS: CPT | Performed by: PHYSICAL THERAPIST

## 2018-05-07 PROCEDURE — 97112 NEUROMUSCULAR REEDUCATION: CPT | Performed by: PHYSICAL THERAPIST

## 2018-05-07 NOTE — PROGRESS NOTES
Daily Note     Today's date: 2018  Patient name: Nitish Garner  : 1950  MRN: [id number]  Referring provider: Michael Moon MD  Dx:   Encounter Diagnosis     ICD-10-CM    1  H/O total shoulder replacement, right Z96 611    2  Primary osteoarthritis of right shoulder M19 011                   Subjective: Patient stated no significant pain prior to treatment session this visit  Objective: See treatment diary below  Incision C/D/I with staples intact; dressing changed this session  Manual                      R shoulder PROM flexion, ER to limit set by MD (elbow at side), Ir, Cross body adduction; elbow PROM  15'  15'                                                                                                                         Exercise Diary                      Pendulums a/p, m/l  1 5'ea  2' ea                   scap squeeze  5"x10  5"x20                                          Wrist AROM  20ea  20 ea                   digiflex  red x2'  red x2'                   C/S AROM (SB+rot) 10"x5ea  10" x 5 ea                                                                                                                                                                                                                                                                                                                                                                         Modalities                      CP PRN  np  np                                                                         Assessment: Patient performed exercises without significant c/o pain; moderate pain with manual PROM; declined CP this visit  Plan: Continue per plan of care

## 2018-05-10 ENCOUNTER — OFFICE VISIT (OUTPATIENT)
Dept: PHYSICAL THERAPY | Facility: REHABILITATION | Age: 68
End: 2018-05-10
Payer: COMMERCIAL

## 2018-05-10 DIAGNOSIS — Z96.611 H/O TOTAL SHOULDER REPLACEMENT, RIGHT: Primary | ICD-10-CM

## 2018-05-10 PROCEDURE — 97140 MANUAL THERAPY 1/> REGIONS: CPT | Performed by: PHYSICAL THERAPIST

## 2018-05-10 PROCEDURE — 97112 NEUROMUSCULAR REEDUCATION: CPT | Performed by: PHYSICAL THERAPIST

## 2018-05-10 NOTE — PROGRESS NOTES
Daily Note     Today's date: 5/10/2018  Patient name: Manasa Moses  : 1950  MRN: [id number]  Referring provider: Paige Grimes MD  Dx:   Encounter Diagnosis     ICD-10-CM    1  H/O total shoulder replacement, right Z96 611               1on1 entire session  Subjective:   Pt reports no significant c/o pain upon arrival   Pt's echhymosis has moved distally in her R UE  Objective: See treatment diary below  Manual   5/4  5/7  5/10                 R shoulder PROM flexion, ER to limit set by MD (elbow at side), Ir, Cross body adduction; elbow PROM  15'  15'  15'                                                                                                                       Exercise Diary   5/4  5/7  5/10                 Pendulums a/p, m/l  1 5'ea  2' ea  2'ea                 scap squeeze  5"x10  5"x20  5"x20                                         Wrist AROM  20ea  20 ea  20ea                 digiflex  red x2'  red x2'  red x2'                 C/S AROM (SB+rot) 10"x5ea  10" x 5 ea  10"x5ea                                                                                                                                                                                                                                                                                                                                                                       Modalities   5/4  5/7  5/10                 CP PRN  np  np  10'                                               Assessment: Tolerated treatment well  Improved ROM with manuals this session  Patient would benefit from continued PT      Plan: Continue per plan of care  Pt scheduled to see MD

## 2018-05-14 ENCOUNTER — HOSPITAL ENCOUNTER (OUTPATIENT)
Dept: RADIOLOGY | Facility: HOSPITAL | Age: 68
Discharge: HOME | End: 2018-05-14
Payer: COMMERCIAL

## 2018-05-14 ENCOUNTER — OFFICE VISIT (OUTPATIENT)
Dept: OBGYN CLINIC | Facility: HOSPITAL | Age: 68
End: 2018-05-14

## 2018-05-14 ENCOUNTER — OFFICE VISIT (OUTPATIENT)
Dept: PHYSICAL THERAPY | Facility: REHABILITATION | Age: 68
End: 2018-05-14
Payer: COMMERCIAL

## 2018-05-14 VITALS
BODY MASS INDEX: 22.29 KG/M2 | HEIGHT: 68 IN | WEIGHT: 147.05 LBS | SYSTOLIC BLOOD PRESSURE: 131 MMHG | HEART RATE: 67 BPM | DIASTOLIC BLOOD PRESSURE: 74 MMHG

## 2018-05-14 DIAGNOSIS — Z47.1 AFTERCARE FOLLOWING RIGHT SHOULDER JOINT REPLACEMENT SURGERY: ICD-10-CM

## 2018-05-14 DIAGNOSIS — Z96.611 AFTERCARE FOLLOWING RIGHT SHOULDER JOINT REPLACEMENT SURGERY: Primary | ICD-10-CM

## 2018-05-14 DIAGNOSIS — Z96.611 H/O TOTAL SHOULDER REPLACEMENT, RIGHT: Primary | ICD-10-CM

## 2018-05-14 DIAGNOSIS — Z96.611 AFTERCARE FOLLOWING RIGHT SHOULDER JOINT REPLACEMENT SURGERY: ICD-10-CM

## 2018-05-14 DIAGNOSIS — Z47.1 AFTERCARE FOLLOWING RIGHT SHOULDER JOINT REPLACEMENT SURGERY: Primary | ICD-10-CM

## 2018-05-14 PROCEDURE — 73030 X-RAY EXAM OF SHOULDER: CPT

## 2018-05-14 PROCEDURE — 97110 THERAPEUTIC EXERCISES: CPT | Performed by: PHYSICAL THERAPIST

## 2018-05-14 PROCEDURE — 99024 POSTOP FOLLOW-UP VISIT: CPT | Performed by: PHYSICIAN ASSISTANT

## 2018-05-14 PROCEDURE — 97140 MANUAL THERAPY 1/> REGIONS: CPT | Performed by: PHYSICAL THERAPIST

## 2018-05-14 NOTE — PROGRESS NOTES
Daily Note     Today's date: 2018  Patient name: Helene Dukes  : 1950  MRN: [id number]  Referring provider: Mary Figueroa MD  Dx:   Encounter Diagnosis     ICD-10-CM    1  H/O total shoulder replacement, right Z96 611               1on1 entire session  Subjective: No new complaints with her shoulder  Objective: See treatment diary below  Manual   5/4  5/7  5/10  5/14               R shoulder PROM flexion, ER to limit set by MD (elbow at side), Ir, Cross body adduction; elbow PROM  15'  15'  15'  15'                                                                                                                     Exercise Diary   5/4  5/7  5/10  5/14               Pendulums a/p, m/l  1 5'ea  2' ea  2'ea  2'ea               scap squeeze  5"x10  5"x20  5"x20  5"x20                                       Wrist AROM  20ea  20 ea  20ea  20ea               digiflex  red x2'  red x2'  red x2'  red x2'               C/S AROM (SB+rot) 10"x5ea  10" x 5 ea  10"x5ea  10"x5ea                                                                                                                                                                                                                                                                                                                                                                     Modalities   5/4  5/7  5/10  5/14               CP PRN  np  np  10'  np                     Assessment: Tolerated treatment well  No pain with PROM this session and PROM is improving  Patient would benefit from continued PT as per protocol  Plan: Continue per plan of care

## 2018-05-14 NOTE — PROGRESS NOTES
Assessment:       1  Aftercare following right shoulder joint replacement surgery          Plan:      Patient is doing well postoperatively  Operative note, images, and rehab protocol were discussed  All questions were addressed to the patient's satisfaction  Patient has started PT  Follow-up will be in 2 months to assess patient's progress  Subjective:     Patient ID: Britton Pérez is a 70 y o  female  Chief Complaint:    HPI   Patient presents to the office status post right total shoulder arthroplasty performed on 05/01/2018  Patient is pleasantly surprised by her progress to date  She states her pain is well managed by over-the-counter pain medication  She is pleased with her PT visit today  She denies any numbness or tingling  She does report she has some bruising along her ipsilateral chest area  Social History     Occupational History    Not on file  Social History Main Topics    Smoking status: Former Smoker    Smokeless tobacco: Never Used    Alcohol use Yes      Comment: social  x4/5 week    Drug use: No    Sexual activity: Yes      Review of Systems   Respiratory: Negative for shortness of breath and wheezing  Musculoskeletal: Positive for myalgias  Skin: Positive for wound  Neurological: Positive for weakness  Negative for numbness  Psychiatric/Behavioral: Negative  Objective:         Neurological Testing     Additional Neurological Details  Motor and sensory grossly intact  Physical Exam   Constitutional: She is oriented to person, place, and time  She appears well-developed and well-nourished  Eyes: Pupils are equal, round, and reactive to light  Cardiovascular: Intact distal pulses  Pulmonary/Chest: Effort normal    Musculoskeletal:   Arm in sling  Strength as expected  Range of motion as expected  Neurological: She is alert and oriented to person, place, and time  Motor and sensory grossly intact  Skin: Skin is warm and dry  Wound dry and clean  Staples removed  Steri-Strips applied  Psychiatric: She has a normal mood and affect  Her behavior is normal          I have personally reviewed pertinent films in PACS and my interpretation is Good position of prosthesis with preservation of glenohumeral joint space and alignment  Sybil Vasquez

## 2018-05-17 ENCOUNTER — OFFICE VISIT (OUTPATIENT)
Dept: PHYSICAL THERAPY | Facility: REHABILITATION | Age: 68
End: 2018-05-17
Payer: COMMERCIAL

## 2018-05-17 DIAGNOSIS — Z96.611 H/O TOTAL SHOULDER REPLACEMENT, RIGHT: Primary | ICD-10-CM

## 2018-05-17 PROCEDURE — 97110 THERAPEUTIC EXERCISES: CPT | Performed by: PHYSICAL THERAPIST

## 2018-05-17 PROCEDURE — 97140 MANUAL THERAPY 1/> REGIONS: CPT | Performed by: PHYSICAL THERAPIST

## 2018-05-17 NOTE — PROGRESS NOTES
Daily Note     Today's date: 2018  Patient name: Pee Watkins  : 1950  MRN: [id number]  Referring provider: Carlos Dueñas MD  Dx:   Encounter Diagnosis     ICD-10-CM    1  H/O total shoulder replacement, right Z96 611                   Subjective: Patient notes she is doing okay overall  Objective: See treatment diary below  Manual   5/4  5/7  5/10  5/14  5/17             R shoulder PROM flexion, ER to limit set by MD (elbow at side), Ir, Cross body adduction; elbow PROM  15'  15'  15'  15'  12'                                                                                                                       Exercise Diary   5/4  5/7  5/10  5/14  5/17             Pendulums a/p, m/l  1 5'ea  2' ea  2'ea  2'ea  2' ea             scap squeeze  5"x10  5"x20  5"x20  5"x20  5" x20                                     Wrist AROM  20ea  20 ea  20ea  20ea  20 ea             digiflex  red x2'  red x2'  red x2'  red x2'  red x2'             C/S AROM (SB+rot) 10"x5ea  10" x 5 ea  10"x5ea  10"x5ea  5 x10" ea                                                                                                                                                                                                                                                                                                                                                                   Modalities   5/4  5/7  5/10  5/14  5/17             CP PRN  np  np  10'  np  10'                    Assessment: Tolerated treatment well  Patient would benefit from continued PT  Patient had improved quality of R shoulder movement post session within protocol  Plan: Continue per plan of care

## 2018-05-21 ENCOUNTER — OFFICE VISIT (OUTPATIENT)
Dept: PHYSICAL THERAPY | Facility: REHABILITATION | Age: 68
End: 2018-05-21
Payer: COMMERCIAL

## 2018-05-21 DIAGNOSIS — Z96.611 H/O TOTAL SHOULDER REPLACEMENT, RIGHT: Primary | ICD-10-CM

## 2018-05-21 DIAGNOSIS — M19.011 PRIMARY OSTEOARTHRITIS OF RIGHT SHOULDER: ICD-10-CM

## 2018-05-21 PROCEDURE — 97112 NEUROMUSCULAR REEDUCATION: CPT

## 2018-05-21 PROCEDURE — 97140 MANUAL THERAPY 1/> REGIONS: CPT

## 2018-05-21 NOTE — PROGRESS NOTES
Daily Note     Today's date: 2018  Patient name: Zuleika Monsivais  : 1950  MRN: [id number]  Referring provider: Mae Maradiaga MD  Dx:   Encounter Diagnosis     ICD-10-CM    1  H/O total shoulder replacement, right Z96 611    2  Primary osteoarthritis of right shoulder M19 011                   Subjective: Pt reported intermittent discomfort  Pt is now able to D/C sling to her tolerance as per protocol  Objective: See treatment diary below  Manual   5/4  5/7  5/10  5/14  5/17  5/21           R shoulder PROM flexion, ER to limit set by MD (elbow at side), Ir, Cross body adduction; elbow PROM  15'  15'  15'  15'  12'        15'                                                                                                                 Exercise Diary   5/4  5/7  5/10  5/14  5/17  5/21           Pendulums a/p, m/l  1 5'ea  2' ea  2'ea  2'ea  2' ea  2' ea            scap squeeze  5"x10  5"x20  5"x20  5"x20  5" x20  5"x20                                   Wrist AROM  20ea  20 ea  20ea  20ea  20 ea  20 ea            digiflex  red x2'  red x2'  red x2'  red x2'  red x2'  red x2'           C/S AROM (SB+rot) 10"x5ea  10" x 5 ea  10"x5ea  10"x5ea  5 x10" ea  5 x10" ea             gentle iso (flex,ext, ER)            5"x10 ea                                                                                                                                                                                                                                                                                                                                          Modalities   5/4  5/7  5/10  5/14  5/17  5/21           CP PRN  np  np  10'  np  10'  10'                 Assessment: Tolerated treatment well  Patient demonstrated fatigue post treatment and exhibited good technique with therapeutic exercises  Added light isometrics to tolerance as per protocol  Relief noted with cp  Plan: Continue per plan of care

## 2018-05-23 ENCOUNTER — OFFICE VISIT (OUTPATIENT)
Dept: PHYSICAL THERAPY | Facility: REHABILITATION | Age: 68
End: 2018-05-23
Payer: COMMERCIAL

## 2018-05-23 DIAGNOSIS — Z96.611 H/O TOTAL SHOULDER REPLACEMENT, RIGHT: Primary | ICD-10-CM

## 2018-05-23 DIAGNOSIS — M19.011 PRIMARY OSTEOARTHRITIS OF RIGHT SHOULDER: ICD-10-CM

## 2018-05-23 PROCEDURE — 97140 MANUAL THERAPY 1/> REGIONS: CPT

## 2018-05-23 PROCEDURE — 97112 NEUROMUSCULAR REEDUCATION: CPT

## 2018-05-23 NOTE — PROGRESS NOTES
Daily Note     Today's date: 2018  Patient name: Adriana Olivo  : 1950  MRN: [id number]  Referring provider: Quique Morin MD  Dx:   Encounter Diagnosis     ICD-10-CM    1  H/O total shoulder replacement, right Z96 611    2  Primary osteoarthritis of right shoulder M19 011                   Subjective: Pt feels improvement with shoulder, has discontinued sling use at home      Objective: See treatment diary below      Manual   5/4  5/7  5/10  5/14  5/17  5/21  5/23         R shoulder PROM flexion, ER to limit set by MD (elbow at side), Ir, Cross body adduction; elbow PROM  15'  15'  15'  15'  12'        15'  KP                                                                                                               Exercise Diary   5/4  5/7  5/10  5/14  5/17  5/21  5/23         Pendulums a/p, m/l  1 5'ea  2' ea  2'ea  2'ea  2' ea  2' ea   2' ea         scap squeeze  5"x10  5"x20  5"x20  5"x20  5" x20  5"x20  5"x20                                 Wrist AROM  20ea  20 ea  20ea  20ea  20 ea  20 ea   20 ea         digiflex  red x2'  red x2'  red x2'  red x2'  red x2'  red x2'  red 2'         C/S AROM (SB+rot) 10"x5ea  10" x 5 ea  10"x5ea  10"x5ea  5 x10" ea  5 x10" ea   5x10" ea          gentle iso (flex,ext, ER)            5"x10 ea   5"x10ea                                                                                                                                                                                                                                                                                                                                       Modalities   5/4  5/7  5/10  5/14  5/17  5/21  5/23         CP PRN  np  np  10'  np  10'  10'  decl               Assessment: Tolerated treatment fair  Patient would benefit from continued PT  Pt doing well during PROM, reported no pain, just stretching  Pt required some minor cuing during isometric exercise for form    Pt  able to complete all exercises with no increase in pain during or after session  Pt  1:1 with PTA for entirety  Plan: Continue per plan of care

## 2018-05-29 ENCOUNTER — OFFICE VISIT (OUTPATIENT)
Dept: PHYSICAL THERAPY | Facility: REHABILITATION | Age: 68
End: 2018-05-29
Payer: COMMERCIAL

## 2018-05-29 DIAGNOSIS — Z96.611 H/O TOTAL SHOULDER REPLACEMENT, RIGHT: Primary | ICD-10-CM

## 2018-05-29 PROCEDURE — 97140 MANUAL THERAPY 1/> REGIONS: CPT | Performed by: PHYSICAL THERAPIST

## 2018-05-29 PROCEDURE — 97112 NEUROMUSCULAR REEDUCATION: CPT | Performed by: PHYSICAL THERAPIST

## 2018-05-29 NOTE — PROGRESS NOTES
Daily Note     Today's date: 2018  Patient name: Shira Bar  : 1950  MRN: [id number]  Referring provider: José Miguel Salomon MD  Dx:   Encounter Diagnosis     ICD-10-CM    1  H/O total shoulder replacement, right Z96 611               1on1 entire session  Subjective: Pt offers no new complaints  Pt has d/c sling per protocol without complaint  Objective: See treatment diary below  Manual   5/4  5/7  5/10  5/14  5/17  5/21  5/23  5/29       R shoulder PROM flexion, ER to limit set by MD (elbow at side), Ir, Cross body adduction; elbow PROM  15'  15'  15'  15'  12'        15'  KP  10'  d/c elbow PROM  Added post/inf GH JM's                                                                                                              Exercise Diary   5/4  5/7  5/10  5/14  5/17  5/21  5/23  5/29       Pendulums a/p, m/l  1 5'ea  2' ea  2'ea  2'ea  2' ea  2' ea   2' ea  2'ea       scap squeeze  5"x10  5"x20  5"x20  5"x20  5" x20  5"x20  5"x20  5"x25                               Wrist AROM  20ea  20 ea  20ea  20ea  20 ea  20 ea   20 ea  hep       digiflex  red x2'  red x2'  red x2'  red x2'  red x2'  red x2'  red 2'  grn x2'       C/S AROM (SB+rot) 10"x5ea  10" x 5 ea  10"x5ea  10"x5ea  5 x10" ea  5 x10" ea   5x10" ea  hep        gentle iso (flex,ext, ER)            5"x10 ea   5"x10ea  5"x10ea        pulleys flex                10"x3'        wall climbs                10"x10        table slides flex                10"x10                                                                                                                                                                                                                                                             Modalities   5/4  5/7  5/10  5/14  5/17  5/21  5/23  5/29       CP PRN  np  np  10'  np  10'  10'  decl  10'             Assessment: Tolerated treatment well  Added several AAROM TE's this session to good tolerance    Updated HEP issued to patient  Patient would benefit from continued PT      Plan: Continue per plan of care

## 2018-05-31 ENCOUNTER — EVALUATION (OUTPATIENT)
Dept: PHYSICAL THERAPY | Facility: REHABILITATION | Age: 68
End: 2018-05-31
Payer: COMMERCIAL

## 2018-05-31 DIAGNOSIS — M19.011 PRIMARY OSTEOARTHRITIS OF RIGHT SHOULDER: ICD-10-CM

## 2018-05-31 DIAGNOSIS — Z96.611 H/O TOTAL SHOULDER REPLACEMENT, RIGHT: Primary | ICD-10-CM

## 2018-05-31 PROCEDURE — G8991 OTHER PT/OT GOAL STATUS: HCPCS | Performed by: PHYSICAL THERAPIST

## 2018-05-31 PROCEDURE — 97110 THERAPEUTIC EXERCISES: CPT | Performed by: PHYSICAL THERAPIST

## 2018-05-31 PROCEDURE — 97112 NEUROMUSCULAR REEDUCATION: CPT | Performed by: PHYSICAL THERAPIST

## 2018-05-31 PROCEDURE — 97140 MANUAL THERAPY 1/> REGIONS: CPT | Performed by: PHYSICAL THERAPIST

## 2018-05-31 PROCEDURE — G8990 OTHER PT/OT CURRENT STATUS: HCPCS | Performed by: PHYSICAL THERAPIST

## 2018-05-31 NOTE — PROGRESS NOTES
PT Re-Evaluation     Today's date: 2018  Patient name: Pee Watkins  : 1950  MRN: [id number]  Referring provider: Carlos Dueñas MD  Dx:   Encounter Diagnosis     ICD-10-CM    1  H/O total shoulder replacement, right Z96 611    2  Primary osteoarthritis of right shoulder M19 011            1on1 with PT/PTA entire session  Assessment  Impairments: abnormal or restricted ROM, abnormal movement, activity intolerance and impaired physical strength    Assessment details: Pt has demonstrated improvement in shoulder ROM and function with a decrease in pain since starting therapy  Pt continues to present with decreased shoulder ROM, decreased shoulder strength and decreased function  Pt would benefit from continued skilled PT intervention to address these issues and to maximize function  Will continue to progress as per MD protocol  Understanding of Dx/Px/POC: good   Prognosis: good    Goals  Short Term:  Pt will report decreased levels of pain by at least 2 subjective ratings in 4 weeks-met  Pt will demonstrate improved ROM by at least 10 degrees in 4 weeks-met  Pt will be independent with current HEP-met  Long term goals (to be achieved in 12 weeks):  Pt will demonstrate functional AROM-not met  Pt will be independent in HEP Long Term-not met  Pt will demonstrate improved FOTO, > 63-partially met  Pt will be independent with all ADL's-partially met    Plan  Planned modality interventions: cryotherapy  Planned therapy interventions: activity modification, joint mobilization, manual therapy, neuromuscular re-education, patient education, therapeutic exercise, functional ROM exercises and home exercise program  Frequency: 2x week  Duration in weeks: 8  Treatment plan discussed with: patient        Subjective Evaluation    History of Present Illness  Mechanism of injury: Pt reports 50% improvement since starting PT  FOTO improved to 54 (was 23 at IE)    Pt is reporting improvement with dressing, showering, light household chores  Pt is avoiding lifting/carrying things, repetitive activity, driving, recreational activity  Pt has not been experiencing shoulder pain lately  Pt has recently d/c sling per protocol to good tolerance  Pain  No pain reported          Objective     Active Range of Motion     Additional Active Range of Motion Details  No tested at this time  Passive Range of Motion     Right Shoulder   Flexion: 125 degrees   External rotation 0°: 20 degrees   Internal rotation 45°: 47 degrees           Precautions: Oa, HTN, history of thyroid cancer and thyroid surgery  Follow MD's protocol  Daily Treatment Diary     Manual   5/4  5/7  5/10  5/14  5/17  5/21  5/23  5/29  5/31     R shoulder PROM flexion, ER to limit set by MD (elbow at side), Ir, [place] body adduction; elbow PROM  15'  15'  15'  15'  12'        15'  KP  10'  d/c elbow PROM    Added post/inf GH JM's   perf                                                                                                           Exercise Diary   5/4  5/7  5/10  5/14  5/17  5/21  5/23  5/29  5/31     Pendulums a/p, m/l  1 5'ea  2' ea  2'ea  2'ea  2' ea  2' ea   2' ea  2'ea  2'ea     scap squeeze  5"x10  5"x20  5"x20  5"x20  5" x20  5"x20  5"x20  5"x25 5"x25                             Wrist AROM  20ea  20 ea  20ea  20ea  20 ea  20 ea   20 ea  hep       digiflex  red x2'  red x2'  red x2'  red x2'  red x2'  red x2'  red 2'  grn x2'  grn x2'     C/S AROM (SB+rot) 10"x5ea  10" x 5 ea  10"x5ea  10"x5ea  5 x10" ea  5 x10" ea   5x10" ea  hep        gentle iso (flex,ext, ER)            5"x10 ea   5"x10ea  5"x10ea  5"x10ea      pulleys flex                10"x3'  10"x3'      wall climbs                10"x10  10"x10      table slides flex                10"x10  10"x10                                                                                                                                                                                                                                                           Modalities   5/4  5/7  5/10  5/14  5/17  5/21  5/23  5/29  5/31     CP PRN  np  np  10'  np  10'  10'  decl  10'  10'       Updated measurements and functional status taken this session

## 2018-06-04 ENCOUNTER — OFFICE VISIT (OUTPATIENT)
Dept: PHYSICAL THERAPY | Facility: REHABILITATION | Age: 68
End: 2018-06-04
Payer: COMMERCIAL

## 2018-06-04 DIAGNOSIS — Z96.611 H/O TOTAL SHOULDER REPLACEMENT, RIGHT: Primary | ICD-10-CM

## 2018-06-04 PROCEDURE — 97110 THERAPEUTIC EXERCISES: CPT | Performed by: PHYSICAL THERAPIST

## 2018-06-04 PROCEDURE — 97112 NEUROMUSCULAR REEDUCATION: CPT | Performed by: PHYSICAL THERAPIST

## 2018-06-04 NOTE — PROGRESS NOTES
Daily Note     Today's date: 2018  Patient name: Bret Boyer  : 1950  MRN: [id number]  Referring provider: Niall De Souza MD  Dx:   Encounter Diagnosis     ICD-10-CM    1  H/O total shoulder replacement, right Z96 611               1on entire session  Subjective: Pt reports some soreness from performing wall climbs at home  Pt was advised to perform to tolerance only  Objective: See treatment diary below    Manual   5/4  5/7  5/10  5/14  5/17  5/21  5/23  5/29  5/31  6   R shoulder PROM flexion, ER to limit set by MD (elbow at side), Ir, Gelene Der[place] body adduction; elbow PROM  15'  15'  15'  15'  12'        15'  KP  10'   d/c elbow PROM   Added post/inf GH JM's   EAYJ  perf                                                                                                         Exercise Diary   5/4  5/7  5/10  5/14  5/17  5/21  5/23  5/29  5/31 64   Pendulums a/p, m/l  1 5'ea  2' ea  2'ea  2'ea  2' ea  2' ea   2' ea  2'ea  2'ea 2'ea   scap squeeze  5"x10  5"x20  5"x20  5"x20  5" x20  5"x20  5"x20  5"x25 5"x25  5"x25                           Wrist AROM  20ea  20 ea  20ea  20ea  20 ea  20 ea   20 ea  hep       digiflex  red x2'  red x2'  red x2'  red x2'  red x2'  red x2'  red 2'  grn x2'  grn x2'  grn x2'   C/S AROM (SB+rot) 10"x5ea  10" x 5 ea  10"x5ea  10"x5ea  5 x10" ea  5 x10" ea   5x10" ea  hep        gentle iso (flex,ext, ER)            5"x10 ea   5"x10ea  5"x10ea  5"x10ea  5"x10ea    pulleys flex                10"x3'  10"x3'  10"x3'    wall climbs                10"x10  10"x10  10"x10    table slides flex                10"x10  10"x10  10"x10                                                                                                                                                                                                                                                         Modalities   5/4  5/7  5/10  5/14  5/17  5/21  5/23  5/29  5/31  6/4   CP PRN  np  np  10'  np  10'  10'  decl  10'  10'  10'       Assessment: Tolerated treatment well  Patient exhibited good technique with therapeutic exercises and would benefit from continued PT       Plan: Continue per plan of care  Progress per protocol next week

## 2018-06-07 ENCOUNTER — OFFICE VISIT (OUTPATIENT)
Dept: PHYSICAL THERAPY | Facility: REHABILITATION | Age: 68
End: 2018-06-07
Payer: COMMERCIAL

## 2018-06-07 DIAGNOSIS — Z96.611 H/O TOTAL SHOULDER REPLACEMENT, RIGHT: Primary | ICD-10-CM

## 2018-06-07 DIAGNOSIS — M19.011 PRIMARY OSTEOARTHRITIS OF RIGHT SHOULDER: ICD-10-CM

## 2018-06-07 PROCEDURE — 97140 MANUAL THERAPY 1/> REGIONS: CPT

## 2018-06-07 PROCEDURE — 97112 NEUROMUSCULAR REEDUCATION: CPT

## 2018-06-07 NOTE — PROGRESS NOTES
Daily Note     Today's date: 2018  Patient name: Ray Coello  : 1950  MRN: [id number]  Referring provider: Gia Rosenthal MD  Dx:   Encounter Diagnosis     ICD-10-CM    1  H/O total shoulder replacement, right Z96 611    2  Primary osteoarthritis of right shoulder M19 011                   Subjective: Pt reported she is able to perform more ADL's and is anxious to start driving next week  She also noted can reach out further  Objective: See treatment diary below  Manual               R shoulder PROM flexion, ER to limit set by MD (elbow at side), Ir, Cross body adduction; elbow PROM TK             post/inf GH JM's  TP                                                                                                   Exercise Diary               Pendulums a/p, m/l  2'ea            scap squeeze  5"x25                                    Wrist AROM  HEP              digiflex  green x2'            C/S AROM (SB+rot) HEP               gentle iso (flex,ext, ER)  5"x10 ea                  pulleys flex  10"x3'                   wall climbs 10"x10                    table slides flex 10"x10                                                                                                                                                                                                Modalities   /             CP PRN np                   Assessment: Tolerated treatment well  Patient demonstrated fatigue post treatment and exhibited good technique with therapeutic exercises  VC's needed for correct technique throughout session  Progress NV as per protocol  Plan: Continue per plan of care

## 2018-06-11 ENCOUNTER — OFFICE VISIT (OUTPATIENT)
Dept: PHYSICAL THERAPY | Facility: REHABILITATION | Age: 68
End: 2018-06-11
Payer: COMMERCIAL

## 2018-06-11 DIAGNOSIS — Z96.611 H/O TOTAL SHOULDER REPLACEMENT, RIGHT: Primary | ICD-10-CM

## 2018-06-11 DIAGNOSIS — M19.011 PRIMARY OSTEOARTHRITIS OF RIGHT SHOULDER: ICD-10-CM

## 2018-06-11 PROCEDURE — 97112 NEUROMUSCULAR REEDUCATION: CPT | Performed by: PHYSICAL THERAPIST

## 2018-06-11 PROCEDURE — 97140 MANUAL THERAPY 1/> REGIONS: CPT | Performed by: PHYSICAL THERAPIST

## 2018-06-11 NOTE — PROGRESS NOTES
Daily Note     Today's date: 2018  Patient name: Carrie Duarte  : 1950  MRN: [id number]  Referring provider: Adeola Flores MD  Dx:   Encounter Diagnosis     ICD-10-CM    1  H/O total shoulder replacement, right Z96 611    2  Primary osteoarthritis of right shoulder M19 011               1on1 entire session  Subjective: Pt reports some stiffness today, but no pain  Objective: See treatment diary below    Manual                      R shoulder PROM flexion, ER to limit set by MD (elbow at side), Ir, Cross body adduction;  TK  TP                    post/inf GH JM's  TP  TP                    scapular ROM    TP                                                                         Exercise Diary                      Pendulums a/p, m/l  2'ea  2'ea                   scap squeeze  5"x25  5"x25                    biceps curls                                              digiflex  green x2'  grn x2'                                          gentle iso (flex,ext, ER)  5"x10 ea   5"x10ea                    pulleys flex  10"x3'  10"x3'                    wall climbs 10"x10   10"x10                    table slides flex 10"x10  10"x10                    scap punches                        scap abcs                        S/L ER                        shrugs                        TB LPD                        TB rows                        TB triceps ext                             Modalities                      CP PRN np  10'                       Assessment: Tolerated treatment well  Added scapular ROM this session to good tolerance  Patient would benefit from continued PT  Plan: Continue per plan of care  Progress treament per protocol  Progress NV as per protocol

## 2018-06-14 ENCOUNTER — OFFICE VISIT (OUTPATIENT)
Dept: PHYSICAL THERAPY | Facility: REHABILITATION | Age: 68
End: 2018-06-14
Payer: COMMERCIAL

## 2018-06-14 DIAGNOSIS — Z96.611 H/O TOTAL SHOULDER REPLACEMENT, RIGHT: Primary | ICD-10-CM

## 2018-06-14 DIAGNOSIS — M19.011 PRIMARY OSTEOARTHRITIS OF RIGHT SHOULDER: ICD-10-CM

## 2018-06-14 PROCEDURE — 97112 NEUROMUSCULAR REEDUCATION: CPT | Performed by: PHYSICAL THERAPIST

## 2018-06-14 PROCEDURE — 97140 MANUAL THERAPY 1/> REGIONS: CPT | Performed by: PHYSICAL THERAPIST

## 2018-06-14 NOTE — PROGRESS NOTES
Daily Note     Today's date: 2018  Patient name: Yonas Jorge  : 1950  MRN: [id number]  Referring provider: Tod Medina MD  Dx:   Encounter Diagnosis     ICD-10-CM    1  H/O total shoulder replacement, right Z96 611    2  Primary osteoarthritis of right shoulder M19 011        Start Time: 1426  Stop Time: 1524  Total time in clinic (min): 58 minutes     1on1 entire session  Subjective: Pt states shoulder is feeling "good" and is looking forward to progress her Verna  Objective: See treatment diary below  Manual                    R shoulder PROM flexion, ER to limit set by MD (elbow at side), Ir, Cross body adduction;  TK  TP PROM flex, ER, IR, cross body adduction - perf                  post/inf GH JM's  TP  TP  perf                  scapular ROM    TP  perf                                                                       Exercise Diary                    Pendulums a/p, m/l  2'ea  2'ea  2' ea                 scap squeeze  5"x25  5"x25  dc                  biceps curls                                               digiflex  green x2'  grn x2'  dc                                          gentle iso (flex,ext, ER)  5"x10 ea   5"x10ea  flex, ext, ER, IR 5"x10                  pulleys flex  10"x3'  10"x3'  10"x3                  wall climbs 10"x10   10"x10  10"x10                  table slides flex 10"x10  10"x10  10"x10                  scap punches      5"x10                  scap abcs      x1                  S/L ER      2x10                  shrugs      2x10                  TB LPD      otb x10                  TB rows      otb x10                  TB triceps ext      otb x10                       Modalities                    CP PRN np  10'  10'                       Assessment: Tolerated treatment well  Patient exhibited good technique with therapeutic exercises  Pt feels confident with the new Verna added per protocol    No increase in pain with new TE's  Plan: Continue per plan of care

## 2018-06-18 ENCOUNTER — OFFICE VISIT (OUTPATIENT)
Dept: PHYSICAL THERAPY | Facility: REHABILITATION | Age: 68
End: 2018-06-18
Payer: COMMERCIAL

## 2018-06-18 DIAGNOSIS — M19.011 PRIMARY OSTEOARTHRITIS OF RIGHT SHOULDER: ICD-10-CM

## 2018-06-18 DIAGNOSIS — Z96.611 H/O TOTAL SHOULDER REPLACEMENT, RIGHT: Primary | ICD-10-CM

## 2018-06-18 PROCEDURE — 97112 NEUROMUSCULAR REEDUCATION: CPT

## 2018-06-18 PROCEDURE — 97140 MANUAL THERAPY 1/> REGIONS: CPT

## 2018-06-18 NOTE — PROGRESS NOTES
Daily Note     Today's date: 2018  Patient name: Shira Bar  : 1950  MRN: [id number]  Referring provider: José Miguel Salomon MD  Dx:   Encounter Diagnosis     ICD-10-CM    1  H/O total shoulder replacement, right [id number]    2  Primary osteoarthritis of right shoulder [id number]                   Subjective: Pt reported intermittent discomfort today  Objective: See treatment diary below  Manual                  R shoulder PROM flexion, ER to limit set by MD (elbow at side), Ir, Cross body adduction;  TK  TP PROM flex, ER, IR, cross body adduction - perf  TK                post/inf GH JM's  TP  TP  perf  perf                scapular ROM    TP  perf  perf                                                                     Exercise Diary                  Pendulums a/p, m/l  2'ea  2'ea  2' ea  2' ea                scap squeeze  5"x25  5"x25  dc  dc                biceps curls                                               digiflex  green x2'  grn x2'  dc  dc                                        gentle iso (flex,ext, ER)  5"x10 ea   5"x10ea  flex, ext, ER, IR 5"x10  flex, ext, ER,IR 5"x10                pulleys flex  10"x3'  10"x3'  10"x3  10"x3'                wall climbs 10"x10   10"x10  10"x10  10"x10                table slides flex 10"x10  10"x10  10"x10  10"x10                scap punches      5"x10  5"x10                scap abcs      x1  x1                S/L ER      2x10  20                shrugs      2x10  20                TB LPD      otb x10  otb x10                TB rows      otb x10  otb x10                TB triceps ext      otb x10  otb x10                     Modalities                  CP PRN np  10'  10'  10'                      Assessment: Tolerated treatment well  Patient demonstrated fatigue post treatment and exhibited good technique with therapeutic exercises  VC's needed for correct technique throughout session  Tightness noted along scapular region  Relief with cp  Plan: Continue per plan of care  1 on 1 with PTA and PT

## 2018-06-19 ENCOUNTER — TRANSCRIBE ORDERS (OUTPATIENT)
Dept: ADMINISTRATIVE | Facility: HOSPITAL | Age: 68
End: 2018-06-19

## 2018-06-19 DIAGNOSIS — C73 THYROID CANCER (HCC): Primary | ICD-10-CM

## 2018-06-21 ENCOUNTER — OFFICE VISIT (OUTPATIENT)
Dept: PHYSICAL THERAPY | Facility: REHABILITATION | Age: 68
End: 2018-06-21
Payer: COMMERCIAL

## 2018-06-21 DIAGNOSIS — M19.011 PRIMARY OSTEOARTHRITIS OF RIGHT SHOULDER: ICD-10-CM

## 2018-06-21 DIAGNOSIS — Z96.611 H/O TOTAL SHOULDER REPLACEMENT, RIGHT: Primary | ICD-10-CM

## 2018-06-21 PROCEDURE — 97112 NEUROMUSCULAR REEDUCATION: CPT | Performed by: PHYSICAL THERAPIST

## 2018-06-21 PROCEDURE — 97140 MANUAL THERAPY 1/> REGIONS: CPT | Performed by: PHYSICAL THERAPIST

## 2018-06-21 PROCEDURE — 97110 THERAPEUTIC EXERCISES: CPT | Performed by: PHYSICAL THERAPIST

## 2018-06-21 NOTE — PROGRESS NOTES
Daily Note     Today's date: 2018  Patient name: Gardenia Larsen  : 1950  MRN: [id number]  Referring provider: Salvador Carroll MD  Dx:   Encounter Diagnosis     ICD-10-CM    1  H/O total shoulder replacement, right Z96 611    2  Primary osteoarthritis of right shoulder M19 011        Start Time: 1516  Stop Time: 1610  Total time in clinic (min): 54 minutes   1on1 from 316-400 and concluded with -410  Subjective: Pt reports her shoulder is feeling sore today  Pt denies any activities that could have brought this on         Objective: See treatment diary below  Manual                R shoulder PROM flexion, ER to limit set by MD (elbow at side), Ir, Cross body adduction;  TK  TP PROM flex, ER, IR, cross body adduction - perf  TK  MP              post/inf GH JM's  TP  TP  perf  perf  MP              scapular ROM    TP Leretha Perch  MP                                                                   Exercise Diary                Pendulums a/p, m/l  2'ea  2'ea  2' ea  2' ea   2' ea             scap squeeze  5"x25  5"x25  dc  dc  dc              biceps curls                                               digiflex  green x2'  grn x2'  dc  dc  dc                                      gentle iso (flex,ext, ER)  5"x10 ea   5"x10ea  flex, ext, ER, IR 5"x10  flex, ext, ER,IR 5"x10  5"x10 4 directions              pulleys flex  10"x3'  10"x3'  10"x3  10"x3'  10"x3'              wall climbs 10"x10   10"x10  10"x10  10"x10  10"x10              table slides flex 10"x10  10"x10  10"x10  10"x10  10"x10              scap punches      5"x10  5"x10  5" x10              scap abcs      x1  x1  x1              S/L ER      2x10  20  20              shrugs      2x10  20  20              TB LPD      otb x10  otb x10  otb x10              TB rows      otb x10  otb x10  otb x10              TB triceps ext      otb x10  otb x10  otb x10                   Modalities  6/7 6/11 6/14 6/18 6/21             CP PRN np  10'  10'  10'  10'                   Assessment: Tolerated treatment well  Patient exhibited good technique with therapeutic exercises  Pt demonstrated improved shld hor adduction  Plan: Continue per plan of care

## 2018-06-26 ENCOUNTER — OFFICE VISIT (OUTPATIENT)
Dept: PHYSICAL THERAPY | Facility: REHABILITATION | Age: 68
End: 2018-06-26
Payer: COMMERCIAL

## 2018-06-26 DIAGNOSIS — Z96.611 H/O TOTAL SHOULDER REPLACEMENT, RIGHT: Primary | ICD-10-CM

## 2018-06-26 PROCEDURE — G8990 OTHER PT/OT CURRENT STATUS: HCPCS | Performed by: PHYSICAL THERAPIST

## 2018-06-26 PROCEDURE — G8991 OTHER PT/OT GOAL STATUS: HCPCS | Performed by: PHYSICAL THERAPIST

## 2018-06-26 PROCEDURE — 97110 THERAPEUTIC EXERCISES: CPT | Performed by: PHYSICAL THERAPIST

## 2018-06-26 PROCEDURE — 97112 NEUROMUSCULAR REEDUCATION: CPT | Performed by: PHYSICAL THERAPIST

## 2018-06-26 PROCEDURE — 97140 MANUAL THERAPY 1/> REGIONS: CPT | Performed by: PHYSICAL THERAPIST

## 2018-06-26 NOTE — PROGRESS NOTES
Daily Note     Today's date: 2018  Patient name: Brennan Chavez  : 1950  MRN: [id number]  Referring provider: Jessica Rodriguez MD  Dx:   Encounter Diagnosis     ICD-10-CM    1  H/O total shoulder replacement, right Z96 611               1on1 entire session  Subjective:  Pt reports tightness with reaching OH and some soreness in biceps          Objective: See treatment diary below  Manual              R shoulder PROM flexion, ER to limit set by MD (elbow at side), Ir, Cross body adduction;  TK  TP PROM flex, ER, IR, cross body adduction - perf  TK  MP  PROM to pako             post/inf GH JM's  TP  TP  perf  perf  MP  perf            scapular ROM    TP [place] Listen  MP  perf                                                                 Exercise Diary              Pendulums a/p, m/l  2'ea  2'ea  2' ea  2' ea   2' ea  np           scap squeeze  5"x25  8"V89  dc  dc  dc              biceps curls            2#, 2x10            towel circles cw/ccw            60FN           digiflex  green x2'  grn x2'  dc  dc  dc              RS-supine 90*flex            30"x2            gentle iso (flex,ext, ER)  5"x10 ea   5"x10ea  flex, ext, ER, IR 5"x10  flex, ext, ER,IR 5"x10  5"x10 4 directions  np            pulleys flex  10"x3'  10"x3'  10"x3  10"x3'  10"x3'  flex/scap, 10"x3'ea            wall climbs 10"x10   10"x10  10"x10  10"x10  10"x10  10"x10            table slides flex 10"x10  10"x10  10"x10  10"x10  10"x10  10"x10            scap punches      5"x10  5"x10  5" x10 5", 2x10            scap abcs      x1  x1  x1  x1            S/L ER      2x10  20  20  20            shrugs      2x10  20  20  2#x20            TB LPD/rows      otb x10  otb x10  otb x10  otb 2x10            TB IR/ER         peach x10ea            TB triceps ext      otb x10  otb x10  otb x10  otb 2x10                 Modalities              CP PRN np  10'  10'  10'  10'  10'                 Assessment: Tolerated treatment well  Good tolerance to progressions this session (new TE's and increased reps)  Mild soreness post TE's; relieved with CP  Patient would benefit from continued PT      Plan: Continue per plan of care

## 2018-06-29 ENCOUNTER — OFFICE VISIT (OUTPATIENT)
Dept: PHYSICAL THERAPY | Facility: REHABILITATION | Age: 68
End: 2018-06-29
Payer: COMMERCIAL

## 2018-06-29 DIAGNOSIS — Z96.611 H/O TOTAL SHOULDER REPLACEMENT, RIGHT: Primary | ICD-10-CM

## 2018-06-29 DIAGNOSIS — M19.011 PRIMARY OSTEOARTHRITIS OF RIGHT SHOULDER: ICD-10-CM

## 2018-06-29 PROCEDURE — 97112 NEUROMUSCULAR REEDUCATION: CPT

## 2018-06-29 PROCEDURE — 97140 MANUAL THERAPY 1/> REGIONS: CPT

## 2018-06-29 PROCEDURE — 97110 THERAPEUTIC EXERCISES: CPT

## 2018-06-29 NOTE — PROGRESS NOTES
Daily Note     Today's date: 2018  Patient name: Adriana Olivo  : 1950  MRN: [id number]  Referring provider: Quique Morin MD  Dx:   Encounter Diagnosis     ICD-10-CM    1  H/O total shoulder replacement, right Z96 611    2   Primary osteoarthritis of right shoulder M19 011                   Subjective: Pt  reports no change in status upon arrival       Objective: See treatment diary below      Manual            R shoulder PROM flexion, ER to limit set by MD (elbow at side), Ir, Cross body adduction;  TK  TP PROM flex, ER, IR, cross body adduction - perf  TK  MP  PROM to pako   KP          post/inf GH JM's  TP  TP  perf  perf  MP  perf            scapular ROM    TP Lyndsey Ardon  MP  perf  KP                                                               Exercise Diary            Pendulums a/p, m/l  2'ea  2'ea  2' ea  2' ea   2' ea  np  np          biceps curls            2#, 2x10  2x10 2#          towel circles cw/ccw            15FA  65SU          RS-supine 90*flex            30"x2            gentle iso (flex,ext, ER)  5"x10 ea   5"x10ea  flex, ext, ER, IR 5"x10  flex, ext, ER,IR 5"x10  5"x10 4 directions  np  np          pulleys flex  10"x3'  10"x3'  10"x3  10"x3'  10"x3'  flex/scap, 10"x3'ea  10"x3' ea          wall climbs 10"x10   10"x10  10"x10  10"x10  10"x10  10"x10  10x10"          table slides flex 10"x10  10"x10  10"x10  10"x10  10"x10  10"x10  10x10"          scap punches      5"x10  5"x10  5" x10 5", 2x10  2x10 5"          scap abcs      x1  x1  x1  x1  x1          S/L ER      2x10  20  20  20  20          shrugs      2x10  20  20  2#x20  20 2#          TB LPD/rows      otb x10  otb x10  otb x10  otb 2x10  2x10 OTB          TB IR/ER         peach x10ea  PTB 10ea          TB triceps ext      otb x10  otb x10  otb x10  otb 2x10  2x10 OTB               Modalities          CP PRN np  10'  10'  10'  10'  10'  10'             Assessment: Tolerated treatment well  Patient demonstrated fatigue post treatment and would benefit from continued PT  Pt notes soreness with towel on wall exercises, but no pain increase  Pt  able to complete all exercises with no increase in pain during or after session  Pt had good scapular ROM this session  Pt  1:1 with PTA until 1014, on ice for remainder  Plan: Continue per plan of care

## 2018-07-02 ENCOUNTER — OFFICE VISIT (OUTPATIENT)
Dept: PHYSICAL THERAPY | Facility: REHABILITATION | Age: 68
End: 2018-07-02
Payer: COMMERCIAL

## 2018-07-02 DIAGNOSIS — M19.011 PRIMARY OSTEOARTHRITIS OF RIGHT SHOULDER: ICD-10-CM

## 2018-07-02 DIAGNOSIS — Z96.611 H/O TOTAL SHOULDER REPLACEMENT, RIGHT: Primary | ICD-10-CM

## 2018-07-02 PROCEDURE — 97110 THERAPEUTIC EXERCISES: CPT

## 2018-07-02 PROCEDURE — 97140 MANUAL THERAPY 1/> REGIONS: CPT

## 2018-07-02 PROCEDURE — 97112 NEUROMUSCULAR REEDUCATION: CPT

## 2018-07-02 NOTE — PROGRESS NOTES
Daily Note     Today's date: 2018  Patient name: Saul Barnes  : 1950  MRN: [id number]  Referring provider: David Chaney MD  Dx:   Encounter Diagnosis     ICD-10-CM    1  H/O total shoulder replacement, right Z96 611    2  Primary osteoarthritis of right shoulder M19 011                   Subjective: Pt reported intermittent discomfort noted  Compliant with HEP  Objective: See treatment diary below  Manual     7       R shoulder PROM flexion, ER to limit set by MD (elbow at side), Ir, Cross body adduction;  TK  TP PROM flex, ER, IR, cross body adduction - perf  TK  MP  PROM to pako   KP  TK        post/inf GH JM's  TP  TP  perf  perf  MP  perf    MP,SPT        scapular ROM    TP  perf  perf  MP  perf  KP  MP, SPT                                                             Exercise Diary     7       Pendulums a/p, m/l  2'ea  2'ea  2' ea  2' ea   2' ea  np  np  HEP        biceps curls            2#, 2x10  2x10 2#  2#x20        towel circles cw/ccw            15ea  15ea  15 ea         RS-supine 90*flex            30"x2    30"x2        gentle iso (flex,ext, ER)  5"x10 ea   5"x10ea  flex, ext, ER, IR 5"x10  flex, ext, ER,IR 5"x10  5"x10 4 directions  np  np  HEP        pulleys flex  10"x3'  10"x3'  10"x3  10"x3'  10"x3'  flex/scap, 10"x3'ea  10"x3' ea  10"x3' ea   Flex/scap        wall climbs 10"x10   10"x10  10"x10  10"x10  10"x10  10"x10  10x10"  10"x10        table slides flex 10"x10  10"x10  10"x10  10"x10  10"x10  10"x10  10x10"  10"x10        scap punches      5"x10  5"x10  5" x10 5", 2x10  2x10 5"  5"x20        scap abcs      x1  x1  x1  x1  x1  x1        S/L ER      2x10  20  20  20  20  20        shrugs      2x10  20  20  2#x20  20 2#  2#x20        TB LPD/rows      otb x10  otb x10  otb x10  otb 2x10  2x10 OTB  otb x20 ea         TB IR/ER            peach x10ea  PTB 10ea  peach tb x10 ea         TB triceps ext      otb x10  otb x10  otb x10  otb 2x10  2x10 OTB  otb x20             Modalities   6/7 6/11 6/14 6/18 6/21 6/26 6/29 7/2       CP PRN np  10'  10'  10'  10'  10'  10'  10'              Assessment: Tolerated treatment well  Patient demonstrated fatigue post treatment and exhibited good technique with therapeutic exercises  Pt noted some discomfort with TB series and towel circles  Relief with cp  Plan: Continue per plan of care

## 2018-07-05 ENCOUNTER — OFFICE VISIT (OUTPATIENT)
Dept: PHYSICAL THERAPY | Facility: REHABILITATION | Age: 68
End: 2018-07-05
Payer: COMMERCIAL

## 2018-07-05 DIAGNOSIS — Z96.611 H/O TOTAL SHOULDER REPLACEMENT, RIGHT: Primary | ICD-10-CM

## 2018-07-05 DIAGNOSIS — M19.011 PRIMARY OSTEOARTHRITIS OF RIGHT SHOULDER: ICD-10-CM

## 2018-07-05 PROCEDURE — 97112 NEUROMUSCULAR REEDUCATION: CPT

## 2018-07-05 PROCEDURE — 97140 MANUAL THERAPY 1/> REGIONS: CPT

## 2018-07-05 PROCEDURE — 97110 THERAPEUTIC EXERCISES: CPT

## 2018-07-05 NOTE — PROGRESS NOTES
Daily Note     Today's date: 2018  Patient name: Jose C Hill  : 1950  MRN: [id number]  Referring provider: Mikki Anna MD  Dx:   Encounter Diagnosis     ICD-10-CM    1  H/O total shoulder replacement, right Z96 611    2  Primary osteoarthritis of right shoulder M19 011                   Subjective: Pt reported having good and bad days  She noted having back pain which has slowed her down recently  Objective: See treatment diary below  Manual        R shoulder PROM flexion, ER to limit set by MD (elbow at side), Ir, Cross body adduction;  TK  TP PROM flex, ER, IR, cross body adduction - perf  TK  MP  PROM to pako   KP  TK  TK      post/inf GH JM's  TP  TP  perf  perf  MP  perf    MP,SPT  MJ      scapular ROM    TP  perf  perf  MP Abebe Lemming  MP, SPT  perf                                                           Exercise Diary        Pendulums a/p, m/l  2'ea  2'ea  2' ea  2' ea   2' ea  np  np  HEP  hep      biceps curls            2#, 2x10  2x10 2#  2#x20  2#x20      towel circles cw/ccw            15ea  15ea  15 ea   15 ea       RS-supine 90*flex            30"x2    30"x2  30"x2      gentle iso (flex,ext, ER)  5"x10 ea   5"x10ea  flex, ext, ER, IR 5"x10  flex, ext, ER,IR 5"x10  5"x10 4 directions  np  np  HEP  hep      pulleys flex  10"x3'  10"x3'  10"x3  10"x3'  10"x3'  flex/scap, 10"x3'ea  10"x3' ea  10"x3' ea   Flex/scap  10"x3' ea      wall climbs 10"x10   10"x10  10"x10  10"x10  10"x10  10"x10  10x10"  10"x10  10"x10      table slides flex 10"x10  10"x10  10"x10  10"x10  10"x10  10"x10  10x10"  10"x10  10"x10      scap punches      5"x10  5"x10  5" x10 5", 2x10  2x10 5"  5"x20  5"x20      scap abcs      x1  x1  x1  x1  x1  x1  x1      S/L ER      2x10  20  20  20  20  20  20      shrugs      2x10  20  20  2#x20  20 2#  2#x20  2#x20      TB LPD/rows      otb x10  otb x10  otb x10  otb 2x10  2x10 OTB  otb x20 ea   otb x20 ea       TB IR/ER            peach x10ea  PTB 10ea  peach tb x10 ea   peach tb x10 ea      TB triceps ext      otb x10  otb x10  otb x10  otb 2x10  2x10 OTB  otb x20  otb x20           Modalities   6/7 6/11 6/14 6/18 6/21 6/26 6/29 7/2 7/5     CP PRN np  10'  10'  10'  10'  10'  10'  10'  10'            Assessment: Tolerated treatment well  Patient demonstrated fatigue post treatment and exhibited good technique with therapeutic exercises  Vc's needed for correct technique throughout session  Relief with cp  Plan: Continue per plan of care   1 on 1 with PTA for 45 mins; performed rest of session under fitness program

## 2018-07-09 ENCOUNTER — EVALUATION (OUTPATIENT)
Dept: PHYSICAL THERAPY | Facility: REHABILITATION | Age: 68
End: 2018-07-09
Payer: COMMERCIAL

## 2018-07-09 ENCOUNTER — HOSPITAL ENCOUNTER (OUTPATIENT)
Dept: RADIOLOGY | Facility: HOSPITAL | Age: 68
Discharge: HOME | End: 2018-07-09
Payer: COMMERCIAL

## 2018-07-09 DIAGNOSIS — M19.011 PRIMARY OSTEOARTHRITIS OF RIGHT SHOULDER: ICD-10-CM

## 2018-07-09 DIAGNOSIS — C73 THYROID CANCER (HCC): ICD-10-CM

## 2018-07-09 DIAGNOSIS — Z96.611 H/O TOTAL SHOULDER REPLACEMENT, RIGHT: Primary | ICD-10-CM

## 2018-07-09 PROCEDURE — 76536 US EXAM OF HEAD AND NECK: CPT

## 2018-07-09 PROCEDURE — 97110 THERAPEUTIC EXERCISES: CPT | Performed by: PHYSICAL THERAPIST

## 2018-07-09 PROCEDURE — 97112 NEUROMUSCULAR REEDUCATION: CPT | Performed by: PHYSICAL THERAPIST

## 2018-07-09 PROCEDURE — 97140 MANUAL THERAPY 1/> REGIONS: CPT | Performed by: PHYSICAL THERAPIST

## 2018-07-09 NOTE — PROGRESS NOTES
PT Evaluation     Today's date: 2018  Patient name: Gasper Qureshi  : 1950  MRN: [id number]  Referring provider: Ravindra Anna MD  Dx:   Encounter Diagnosis     ICD-10-CM    1  H/O total shoulder replacement, right Z96 611    2  Primary osteoarthritis of right shoulder M19 011                   Assessment  Impairments: abnormal or restricted ROM, activity intolerance, impaired physical strength and pain with function    Assessment details: Pt demonstrates an improvement in household chores, ADLs and increased UE ROM and strength  Pt presents with decreased UE ROM, decreased UE strength, and slight pain with function  Pt would benefit from continued skilled PT intervention to address these impairments and maximize function  Pt tolerated wall slides and weight with scap punches/ ABCs well  Understanding of Dx/Px/POC: good   Prognosis: good    Goals  Short Term:  Pt will report decreased levels of pain by at least 2 subjective ratings in 4 weeks- not met  Pt will demonstrate improved ROM by at least 10 degrees in 4 weeks- partially met  Pt will be independent with current HEP-met  Long term goals (to be achieved in 12 weeks):  Pt will demonstrate functional AROM- partially met  Pt will be independent in HEP Long Term-partially met  Pt will demonstrate improved FOTO, > 63- partially met  Pt will be independent with all ADL's- met  Pt will resume recreational activity with minimal pain  -not met    Plan  Patient would benefit from: skilled physical therapy  Planned modality interventions: cryotherapy  Planned therapy interventions: manual therapy, functional ROM exercises, home exercise program, therapeutic exercise, therapeutic activities and patient education  Frequency: 2x week  Duration in weeks: 6  Treatment plan discussed with: patient  Plan details: POC to continue PT           Subjective Evaluation    History of Present Illness  Mechanism of injury: Pt states return to normal function in dressing, showering, driving, and light household chores  Pt reports being able to lift/carry items and stops when she feels slight pain in shoulder  Pt states she can now vacuum and iron clothes  Pt reports slight pain while doing strengthening exercises  Pt goals are getting back to playing golf and swimming laps (freestyle and backstroke)  Pt states seeing MD on   Pt self reported improvement is 75%  Pain  At best pain ratin  At worst pain rating: 3  Location: R shoulder    Patient Goals  Patient goals for therapy: decreased pain and return to sport/leisure activities          Objective     Active Range of Motion     Right Shoulder   Flexion: 110 degrees   Abduction: 93 degrees   External rotation 0°: 61 degrees   Internal rotation 0°: WFL    Passive Range of Motion     Right Shoulder   Flexion: 130 degrees   Abduction: 131 degrees   External rotation 45°: 63 degrees   Internal rotation 45°: 48 degrees     Strength/Myotome Testing     Right Shoulder     Planes of Motion   Flexion: 3+   External rotation at 0°: 4-   Internal rotation at 0°: 4     Additional Strength Details  Slight pain with ER          Precautions: Oa, HTN, history of thyroid cancer and thyroid surgery  Follow MD's protocol      Daily Treatment Diary     Manual      R shoulder PROM flexion, ER to limit set by MD (elbow at side), Ir, Cross body adduction;  TK  TP PROM flex, ER, IR, cross body adduction - perf  TK  MP  PROM to pako   KP  TK  TK  perf    post/inf GH JM's  TP  TP  perf  perf  MP  perf    MP,SPT  MJ  perf    scapular ROM    TP  perf  perf  MP  perf  KP  MP, SPT  perf  NP                                                         Exercise Diary      Pendulums a/p, m/l  2'ea  2'ea  2' ea  2' ea   2' ea  np  np  HEP  hep  HEP    biceps curls            2#, 2x10  2x10 2#  2#x20  2#x20  np    towel circles cw/ccw            10AQ  15ea  15 ea   17 ea   15 ea    RS-supine 90*flex            30"x2    30"x2  30"x2  np    gentle iso (flex,ext, ER)  5"x10 ea   5"x10ea  flex, ext, ER, IR 5"x10  flex, ext, ER,IR 5"x10  5"x10 4 directions  np  np  HEP  hep  hep    pulleys flex  10"x3'  10"x3'  10"x3  10"x3'  10"x3'  flex/scap, 10"x3'ea  10"x3' ea  10"x3' ea  Flex/scap  10"x3' ea  10"x3' ea    wall climbs 10"x10   10"x10  10"x10  10"x10  10"x10  10"x10  10x10"  10"x10  10"x10  10"x10    table slides flex 10"x10  10"x10  10"x10  10"x10  10"x10  10"x10  10x10"  10"x10  10"x10  np    scap punches      5"x10  5"x10  5" x10 5", 2x10  2x10 5"  5"x20  5"x20  1# 3"x15    scap abcs      x1  x1  x1  x1  x1  x1  x1  x1 1#    S/L ER      2x10  20  20  20  20  20  20  np    shrugs      2x10  20  20  2#x20  20 2#  2#x20  2#x20  np    TB LPD/rows      otb x10  otb x10  otb x10  otb 2x10  2x10 OTB  otb x20 ea   otb x20 ea   otb x20 ea    TB IR/ER            peach x10ea  PTB 10ea  peach tb x10 ea   peach tb x10 ea  otb x10 IR peach tb x15 ER   TB triceps ext   otb x10 otb x10 otb x10 otb 2x10 otb 2x10 otb x20 otb x20 otb x20   Wall slides          5"x10   Standing flex/scap AROM @mirror             10 ea         Modalities   6/7  6/11  6/14  6/18  6/21  6/26  6/29  7/2  7/5  7/9   CP PRN np  10'  10'  10'  10'  10'  10'  10'  10'  10'     Updated measurements and functional status taken this session

## 2018-07-12 ENCOUNTER — OFFICE VISIT (OUTPATIENT)
Dept: PHYSICAL THERAPY | Facility: REHABILITATION | Age: 68
End: 2018-07-12
Payer: COMMERCIAL

## 2018-07-12 DIAGNOSIS — M19.011 PRIMARY OSTEOARTHRITIS OF RIGHT SHOULDER: ICD-10-CM

## 2018-07-12 DIAGNOSIS — Z96.611 H/O TOTAL SHOULDER REPLACEMENT, RIGHT: Primary | ICD-10-CM

## 2018-07-12 PROCEDURE — 97110 THERAPEUTIC EXERCISES: CPT

## 2018-07-12 PROCEDURE — 97112 NEUROMUSCULAR REEDUCATION: CPT

## 2018-07-12 PROCEDURE — 97140 MANUAL THERAPY 1/> REGIONS: CPT

## 2018-07-12 NOTE — PROGRESS NOTES
Daily Note     Today's date: 2018  Patient name: Carmen Starr  : 1950  MRN: [id number]  Referring provider: Yobany Coleman MD  Dx:   Encounter Diagnosis     ICD-10-CM    1  H/O total shoulder replacement, right Z96 611    2  Primary osteoarthritis of right shoulder M19 011                   Subjective: Pt reported seeing slow improvement  Objective: See treatment diary below  Manual               R shoulder PROM flexion, ER to limit set by MD (elbow at side), Ir, Cross body adduction; TK             post/inf GH JM's  TP             scapular ROM                                                            Exercise Diary                Pendulums a/p, m/l  HEP              biceps curls  2#x20                 towel circles cw/ccw  15 ea                   RS-supine 90*flex                  gentle iso (flex,ext, ER) hep             pulleys flex  10"x3' ea               wall climbs 10"x10              table slides flex 10"x10-np              scap punches  1#  3"x15               scap abcs  1# x1               S/L ER  20              shrugs  2#x20              TB LPD/rows  otb x20 ea               TB IR/ER  otb x10 IR  Peach tb x15 ER                TB triceps ext  otb x20             Wall slides  10"x10                    Standing flex/scap AROM @mirror  10 ea                   10 ea         Modalities               CP PRN 10'                   Assessment: Tolerated treatment well  Patient demonstrated fatigue post treatment, exhibited good technique with therapeutic exercises and would benefit from continued PT  VC"s needed for correct technique throughout session  Challenged with towel circles on wall and flex/scap at the mirror  Relief with cp  Plan: Continue per plan of care

## 2018-07-16 ENCOUNTER — OFFICE VISIT (OUTPATIENT)
Dept: OBGYN CLINIC | Facility: HOSPITAL | Age: 68
End: 2018-07-16

## 2018-07-16 ENCOUNTER — OFFICE VISIT (OUTPATIENT)
Dept: PHYSICAL THERAPY | Facility: REHABILITATION | Age: 68
End: 2018-07-16
Payer: COMMERCIAL

## 2018-07-16 VITALS
BODY MASS INDEX: 23.16 KG/M2 | DIASTOLIC BLOOD PRESSURE: 79 MMHG | HEART RATE: 60 BPM | SYSTOLIC BLOOD PRESSURE: 125 MMHG | WEIGHT: 152.8 LBS | HEIGHT: 68 IN

## 2018-07-16 DIAGNOSIS — Z96.611 AFTERCARE FOLLOWING RIGHT SHOULDER JOINT REPLACEMENT SURGERY: Primary | ICD-10-CM

## 2018-07-16 DIAGNOSIS — Z47.1 AFTERCARE FOLLOWING RIGHT SHOULDER JOINT REPLACEMENT SURGERY: Primary | ICD-10-CM

## 2018-07-16 DIAGNOSIS — Z96.611 H/O TOTAL SHOULDER REPLACEMENT, RIGHT: Primary | ICD-10-CM

## 2018-07-16 DIAGNOSIS — M19.011 PRIMARY OSTEOARTHRITIS OF RIGHT SHOULDER: ICD-10-CM

## 2018-07-16 PROCEDURE — 97112 NEUROMUSCULAR REEDUCATION: CPT | Performed by: PHYSICAL THERAPIST

## 2018-07-16 PROCEDURE — 97140 MANUAL THERAPY 1/> REGIONS: CPT | Performed by: PHYSICAL THERAPIST

## 2018-07-16 PROCEDURE — 99024 POSTOP FOLLOW-UP VISIT: CPT | Performed by: PHYSICIAN ASSISTANT

## 2018-07-16 PROCEDURE — 97110 THERAPEUTIC EXERCISES: CPT | Performed by: PHYSICAL THERAPIST

## 2018-07-16 NOTE — PROGRESS NOTES
Assessment:       1  Aftercare following right shoulder joint replacement surgery          Plan:        Patient is doing well postoperatively  Rehab protocol were discussed  All questions were addressed to the patient's satisfaction  Patient has continued to work with PT  Follow-up will be in 2 months to assess patient's progress  Subjective:     Patient ID: Nitish Garner is a 70 y o  female  Chief Complaint:    HPI  Patient presents to the office 2 and half months status post right total shoulder arthroplasty  She is doing well, making progress with physical therapy  She is eager to start playing golf  She denies any numbness or tingling  She does states she still occasionally has pain, mostly when she wakes up in the morning  Social History     Occupational History    Not on file  Social History Main Topics    Smoking status: Former Smoker    Smokeless tobacco: Never Used    Alcohol use Yes      Comment: social  x4/5 week    Drug use: No    Sexual activity: Yes      Review of Systems   Respiratory: Negative  Musculoskeletal: Positive for myalgias  Negative for arthralgias  Skin: Negative for wound  Neurological: Positive for weakness  Negative for numbness  Psychiatric/Behavioral: Negative  Objective:     Ortho ExamPhysical Exam   Constitutional: She is oriented to person, place, and time  She appears well-developed and well-nourished  HENT:   Head: Normocephalic  Cardiovascular: Intact distal pulses  Pulmonary/Chest: Effort normal    Musculoskeletal: She exhibits no edema or deformity  Active forward flexion to 120°, abduction 120°  Strength not tested  Neurological: She is alert and oriented to person, place, and time  Skin: Skin is warm and dry  Incisions well healed  Psychiatric: She has a normal mood and affect   Her behavior is normal

## 2018-07-16 NOTE — PROGRESS NOTES
Daily Note     Today's date: 2018  Patient name: Helene Dukes  : 1950  MRN: [id number]  Referring provider: Mary Figueroa MD  Dx:   Encounter Diagnosis     ICD-10-CM    1  H/O total shoulder replacement, right [id number] [id number]    2  Primary osteoarthritis of right shoulder M19 [id number]        Start Time: 1355  Stop Time: 1445  Total time in clinic (min): 50 minutes    Subjective: Pt reports swimming over the weekend with much difficulty in attempting breast stroke  Notes slight increase in soreness and stiffness over the weekend  Objective: See treatment diary below  Manual              R shoulder PROM flexion, ER to limit set by MD (elbow at side), Ir, Cross body adduction; TK             post/inf GH JM's  TP             scapular ROM               ISTM to UT   EB                                          Exercise Diary               Pendulums a/p, m/l  HEP HEP             biceps curls  2#x20                 towel circles cw/ccw  15 ea                   RS-supine 90*flex                  gentle iso (flex,ext, ER) hep HEP            pulleys flex  10"x3' ea  3' flex  3' scap             wall climbs 10"x10  10 x 10"            table slides flex 10"x10-np NP             scap punches  1#  3"x15  1 #  3" x 20             scap abcs  1# x1  1# x 1             S/L ER  20  1# x 20            shrugs  2#x20              TB LPD/rows  otb x20 ea   OTB x 20            TB IR/ER  otb x10 IR  [place] tb x15 ER  OTB x 10  PTB x 15              TB triceps ext  otb x20  OTB x 20           Wall slides  10"x10  10x 10"                  Standing flex/scap AROM @mirror  10 ea   10 ea                10 ea   UT Stretch  3 x 30"                 Modalities               CP PRN 10'                   Assessment: Tolerated treatment well   Patient demonstrated fatigue post treatment, exhibited good technique with therapeutic exercises and would benefit from continued PT  VC"s needed for correct technique throughout session  Challenged with towel circles on wall and flex/scap at the mirror  Added ISTM due to TTP UT  Noted improvement in Shoulder range of motion following ISTM to UT and UT stretching  Plan: Continue per plan of care

## 2018-07-19 ENCOUNTER — OFFICE VISIT (OUTPATIENT)
Dept: PHYSICAL THERAPY | Facility: REHABILITATION | Age: 68
End: 2018-07-19
Payer: COMMERCIAL

## 2018-07-19 DIAGNOSIS — M19.011 PRIMARY OSTEOARTHRITIS OF RIGHT SHOULDER: ICD-10-CM

## 2018-07-19 DIAGNOSIS — Z96.611 STATUS POST TOTAL REPLACEMENT OF RIGHT SHOULDER: ICD-10-CM

## 2018-07-19 DIAGNOSIS — Z47.1 AFTERCARE FOLLOWING RIGHT SHOULDER JOINT REPLACEMENT SURGERY: ICD-10-CM

## 2018-07-19 DIAGNOSIS — Z96.611 AFTERCARE FOLLOWING RIGHT SHOULDER JOINT REPLACEMENT SURGERY: ICD-10-CM

## 2018-07-19 DIAGNOSIS — Z96.611 H/O TOTAL SHOULDER REPLACEMENT, RIGHT: Primary | ICD-10-CM

## 2018-07-19 PROCEDURE — 97110 THERAPEUTIC EXERCISES: CPT

## 2018-07-19 PROCEDURE — G8991 OTHER PT/OT GOAL STATUS: HCPCS

## 2018-07-19 PROCEDURE — 97140 MANUAL THERAPY 1/> REGIONS: CPT

## 2018-07-19 PROCEDURE — G8990 OTHER PT/OT CURRENT STATUS: HCPCS

## 2018-07-19 PROCEDURE — 97112 NEUROMUSCULAR REEDUCATION: CPT

## 2018-07-19 NOTE — PROGRESS NOTES
Daily Note     Today's date: 2018  Patient name: Agnieszka Alas  : 1950  MRN: [id number]  Referring provider: Bernadette Roy MD  Dx:   Encounter Diagnosis     ICD-10-CM    1  H/O total shoulder replacement, right Z96 611    2  Primary osteoarthritis of right shoulder M19 011                   Subjective: Pt noted intermittent soreness/discomfort persists but she has been seeing improvements  Instructed pt on exercises to keep up with when on vacation for 2 weeks  Objective: See treatment diary below  Manual                    R shoulder PROM flexion, ER to limit set by MD (elbow at side), Ir, Cross body adduction; TK    TK                  post/inf GH JM's  TP    MP                  scapular ROM                        ISTM to UT   EB  np                                               Exercise Diary                    Pendulums a/p, m/l  HEP HEP  hep                  biceps curls  2#x20    2#x20                  towel circles cw/ccw  15 ea     15 ea                   RS-supine 90*flex                        gentle iso (flex,ext, ER) hep HEP                    pulleys flex  10"x3' ea  3' flex  3' scap  3'flex and 3' scap                  wall climbs 10"x10  10 x 10" 10"x10                  table slides flex 10"x10-np NP  np                  scap punches  1#  3"x15  1 #  3" x 20  2# 3"x20                  scap abcs  1# x1  1# x 1  2#x1                  S/L ER  20  1# x 20  2#x20                  shrugs  2#x20    2#x20                  TB LPD/rows  otb x20 ea    OTB x 20  otb x520                  TB IR/ER  otb x10 IR  Peach tb x15 ER  OTB x 10  PTB x 15  otb x10  PTB 15                 TB triceps ext  otb x20  OTB x 20  otb x20                 Wall slides  10"x10  10x 10"  10"x10                 Standing flex/scap AROM @mirror  10 ea   10 ea  10 ea               10 ea   UT Stretch   3 x 30"  np                       Modalities                      CP PRN 10'  np                          Assessment: Tolerated treatment well  Patient demonstrated fatigue post treatment and exhibited good technique with therapeutic exercises  VC's needed for correct technique throughout session  Improvement noted  Plan: Continue per plan of care

## 2018-08-07 ENCOUNTER — OFFICE VISIT (OUTPATIENT)
Dept: PHYSICAL THERAPY | Facility: REHABILITATION | Age: 68
End: 2018-08-07
Payer: COMMERCIAL

## 2018-08-07 DIAGNOSIS — Z96.611 H/O TOTAL SHOULDER REPLACEMENT, RIGHT: Primary | ICD-10-CM

## 2018-08-07 DIAGNOSIS — Z96.611 AFTERCARE FOLLOWING RIGHT SHOULDER JOINT REPLACEMENT SURGERY: ICD-10-CM

## 2018-08-07 DIAGNOSIS — Z47.1 AFTERCARE FOLLOWING RIGHT SHOULDER JOINT REPLACEMENT SURGERY: ICD-10-CM

## 2018-08-07 PROCEDURE — 97112 NEUROMUSCULAR REEDUCATION: CPT | Performed by: PHYSICAL THERAPIST

## 2018-08-07 PROCEDURE — 97140 MANUAL THERAPY 1/> REGIONS: CPT | Performed by: PHYSICAL THERAPIST

## 2018-08-07 PROCEDURE — 97110 THERAPEUTIC EXERCISES: CPT | Performed by: PHYSICAL THERAPIST

## 2018-08-07 NOTE — PROGRESS NOTES
Daily Note     Today's date: 2018  Patient name: Hilary Fitch  : 1950  MRN: [id number]  Referring provider: Stephane Johnston MD  Dx:   Encounter Diagnosis     ICD-10-CM    1  H/O total shoulder replacement, right Z96 611    2  Aftercare following right shoulder joint replacement surgery Z47 1     Z96 611               1on1 entire session  Subjective: Pt returns from her 2 week vacation and denies any setbacks with her shoulder  Pt was able to perform "some" exercises while away  Pt notes some soreness this morning from performing some of her exercises  Objective: See treatment diary below  Manual                  R shoulder PROM flexion, ER to limit set by MD (elbow at side), Ir, Cross body adduction; TK    TK  TP                post/inf GH JM's  TP    MP  TP                scapular ROM        np                ISTM to UT   EB  np  np                                             Exercise Diary   7               Pendulums a/p, m/l  HEP HEP  hep  hep                biceps curls  2#x20    2#x20  2#x20                towel circles cw/ccw  15 ea     15 ea   15ea                RS-supine 90*flex        30"x2                gentle iso (flex,ext, ER) hep HEP                    pulleys flex  10"x3' ea   3' flex  3' scap  3'flex and 3' scap  flex/scap 3'ea                wall climbs 10"x10  10 x 10" 10"x10  10"x10                table slides flex 10"x10-np NP  np  np                scap punches  1#  3"x15  1 #  3" x 20  2# 3"x20  2#, 3"x20                scap abcs  1# x1  1# x 1  2#x1  2#x1                S/L ER  20  1# x 20  2#x20  2#x20                shrugs  2#x20    2#x20  2#x20                TB LPD/rows  otb x20 ea    OTB x 20  otb x520  otb x20 ea                TB IR/ER  otb x10 IR  Peach tb x15 ER  OTB x 10  PTB x 15  otb x10  PTB 15  otb x15 IR, PTB x15 ER               TB triceps ext  otb x20  OTB x 20  otb x20  otb x20               Wall slides  10"x10  10x 10"  10"x10  5"x15               Standing flex/scap AROM @mirror  10 ea   10 ea  10 ea   x10ea w/scap assistance               UT Stretch   3 x 30"  np  np                     Modalities   7/12 7/19  8/7                 CP PRN 10'  np  np                       Assessment: Tolerated treatment well  Some tightness with shoulder PROM persists  No increase in pain with TE performance  Patient would benefit from continued PT      Plan: Continue per plan of care  Progress treament per protocol

## 2018-08-10 ENCOUNTER — OFFICE VISIT (OUTPATIENT)
Dept: PHYSICAL THERAPY | Facility: REHABILITATION | Age: 68
End: 2018-08-10
Payer: COMMERCIAL

## 2018-08-10 DIAGNOSIS — Z96.611 STATUS POST TOTAL REPLACEMENT OF RIGHT SHOULDER: ICD-10-CM

## 2018-08-10 DIAGNOSIS — Z96.611 AFTERCARE FOLLOWING RIGHT SHOULDER JOINT REPLACEMENT SURGERY: ICD-10-CM

## 2018-08-10 DIAGNOSIS — Z96.611 H/O TOTAL SHOULDER REPLACEMENT, RIGHT: Primary | ICD-10-CM

## 2018-08-10 DIAGNOSIS — Z47.1 AFTERCARE FOLLOWING RIGHT SHOULDER JOINT REPLACEMENT SURGERY: ICD-10-CM

## 2018-08-10 DIAGNOSIS — M19.011 PRIMARY OSTEOARTHRITIS OF RIGHT SHOULDER: ICD-10-CM

## 2018-08-10 PROCEDURE — 97110 THERAPEUTIC EXERCISES: CPT

## 2018-08-10 PROCEDURE — 97112 NEUROMUSCULAR REEDUCATION: CPT

## 2018-08-10 NOTE — PROGRESS NOTES
Daily Note     Today's date: 8/10/2018  Patient name: Hilary Fitch  : 1950  MRN: [id number]  Referring provider: Stephane Johnston MD  Dx:   Encounter Diagnosis     ICD-10-CM    1  H/O total shoulder replacement, right Z96 611    2  Aftercare following right shoulder joint replacement surgery Z47 1     Z96 611    3  Primary osteoarthritis of right shoulder M19 011    4  Status post total replacement of right shoulder Z96 611                   Subjective: Pt reported discomfort in biceps over past few days especially with flexion  Objective: See treatment diary below  Manual   7/12 7/16  7/19  8/7  8/10             R shoulder PROM flexion, ER to limit set by MD (elbow at side), Ir, Cross body adduction; TK    TK  TP  Tk              post/inf GH JM's  TP    MP  TP  TK              scapular ROM        np  np              ISTM to UT   EB  np  np  np                                           Exercise Diary     810             Pendulums a/p, m/l  HEP HEP  hep  hep  hep              biceps curls  2#x20    2#x20  2#x20  2#x20              towel circles cw/ccw  15 ea     15 ea   15ea  15 ea               RS-supine 90*flex        30"x2                gentle iso (flex,ext, ER) hep HEP                    pulleys flex  10"x3' ea   3' flex  3' scap  3'flex and 3' scap  flex/scap 3'ea  flex/scap 3' ea               wall climbs 10"x10  10 x 10" 10"x10  10"x10  10"x10              table slides flex 10"x10-np NP  np  np  np              scap punches  1#  3"x15  1 #  3" x 20  2# 3"x20  2#, 3"x20  2#; 3"x20              scap abcs  1# x1  1# x 1  2#x1  2#x1  2#x1              S/L ER  20  1# x 20  2#x20  2#x20  2#x20              shrugs  2#x20    2#x20  2#x20  2#x20              TB LPD/rows  otb x20 ea    OTB x 20  otb x520  otb x20 ea  otb x20 ea               TB IR/ER  otb x10 IR  Peach tb x15 ER  OTB x 10  PTB x 15  otb x10  PTB 15  otb x15 IR, PTB x15 ER  otb x15 IR;  PTB x15 ER             TB triceps ext  otb x20  OTB x 20  otb x20  otb x20  otb x20             Wall slides  10"x10  10x 10"  10"x10  5"x15  5"x15             Standing flex/scap AROM @mirror  10 ea   10 ea  10 ea   x10ea w/scap assistance  10 ea  w scap assistance             UT Stretch   3 x 30"  np  np                     Modalities   7/12 7/19  8/7                 CP PRN 10'  np  np                        Assessment: Tolerated treatment well  Patient demonstrated fatigue post treatment and exhibited good technique with therapeutic exercises  VC's needed for correct technique throughout session  Also, pt needed VC's to help relax with PROM  Plan: Continue per plan of care

## 2018-08-13 ENCOUNTER — OFFICE VISIT (OUTPATIENT)
Dept: PHYSICAL THERAPY | Facility: REHABILITATION | Age: 68
End: 2018-08-13
Payer: COMMERCIAL

## 2018-08-13 DIAGNOSIS — Z96.611 H/O TOTAL SHOULDER REPLACEMENT, RIGHT: Primary | ICD-10-CM

## 2018-08-13 DIAGNOSIS — Z96.611 STATUS POST TOTAL REPLACEMENT OF RIGHT SHOULDER: ICD-10-CM

## 2018-08-13 DIAGNOSIS — Z96.611 AFTERCARE FOLLOWING RIGHT SHOULDER JOINT REPLACEMENT SURGERY: ICD-10-CM

## 2018-08-13 DIAGNOSIS — M19.011 PRIMARY OSTEOARTHRITIS OF RIGHT SHOULDER: ICD-10-CM

## 2018-08-13 DIAGNOSIS — Z47.1 AFTERCARE FOLLOWING RIGHT SHOULDER JOINT REPLACEMENT SURGERY: ICD-10-CM

## 2018-08-13 PROCEDURE — 97110 THERAPEUTIC EXERCISES: CPT | Performed by: PHYSICAL THERAPIST

## 2018-08-13 PROCEDURE — 97140 MANUAL THERAPY 1/> REGIONS: CPT | Performed by: PHYSICAL THERAPIST

## 2018-08-13 PROCEDURE — 97112 NEUROMUSCULAR REEDUCATION: CPT | Performed by: PHYSICAL THERAPIST

## 2018-08-16 ENCOUNTER — OFFICE VISIT (OUTPATIENT)
Dept: PHYSICAL THERAPY | Facility: REHABILITATION | Age: 68
End: 2018-08-16
Payer: COMMERCIAL

## 2018-08-16 DIAGNOSIS — M19.011 PRIMARY OSTEOARTHRITIS OF RIGHT SHOULDER: ICD-10-CM

## 2018-08-16 DIAGNOSIS — Z96.611 H/O TOTAL SHOULDER REPLACEMENT, RIGHT: Primary | ICD-10-CM

## 2018-08-16 DIAGNOSIS — Z96.611 STATUS POST TOTAL REPLACEMENT OF RIGHT SHOULDER: ICD-10-CM

## 2018-08-16 DIAGNOSIS — Z47.1 AFTERCARE FOLLOWING RIGHT SHOULDER JOINT REPLACEMENT SURGERY: ICD-10-CM

## 2018-08-16 DIAGNOSIS — Z96.611 AFTERCARE FOLLOWING RIGHT SHOULDER JOINT REPLACEMENT SURGERY: ICD-10-CM

## 2018-08-16 PROCEDURE — 97140 MANUAL THERAPY 1/> REGIONS: CPT

## 2018-08-16 PROCEDURE — 97110 THERAPEUTIC EXERCISES: CPT

## 2018-08-16 NOTE — PROGRESS NOTES
Daily Note     Today's date: 2018  Patient name: Gisell Max  : 1950  MRN: [id number]  Referring provider: Lesly Reyna MD  Dx:   Encounter Diagnosis     ICD-10-CM    1  H/O total shoulder replacement, right Z96 611    2  Aftercare following right shoulder joint replacement surgery Z47 1     Z96 611    3  Primary osteoarthritis of right shoulder M19 011    4  Status post total replacement of right shoulder Z96 611                Subjective: Pt reports decrease in upper arm soreness as compared to LV and notes improved mobility and strength at home  Objective: See treatment diary below    Precautions: Oa, HTN, history of thyroid cancer and thyroid surgery  Follow MD's protocol  Daily Treatment Diary   Manual   7/12 7/16  7/19  8/7  8/10  8/13  8/16       R shoulder PROM  TK    TK  TP  Tk  MD  [facility]        post/inf GH JM's  TP    MP  TP  TK  MD  -        scapular ROM        np  np            ISTM to UT   EB  np  np  np                                     Exercise Diary   7/12 7/16  7/19  8/7  8/10  8/13 8/16         biceps curls  2#x20    2#x20  2#x20  2#x20  2#  20 3#  20x        towel circles cw/ccw  15 ea     15 ea   15ea  15 ea   15 ea Ball  20x ea         RS-supine 90*flex        30"x2    ---  -        pulleys flex  10"x3' ea   3' flex  3' scap  3'flex and 3' scap  flex/scap 3'ea  flex/scap 3' ea   flex/scap  3' ea Flex/scap 3' ea         wall climbs 10"x10  10 x 10" 10"x10  10"x10  10"x10  10"x10 HEP         scap punches  1#  3"x15  1 #  3" x 20  2# 3"x20  2#, 3"x20  2#; 3"x20  2#  3"x20  3#  3"x20        scap abcs  1# x1  1# x 1  2#x1  2#x1  2#x1  2#  x1  3# 1x        S/L ER  20  1# x 20  2#x20  2#x20  2#x20  2#  20 3# 20x        shrugs  2#x20    2#x20  2#x20  2#x20  2#  20 HEP         TB LPD/rows  otb x20 ea   OTB x 20  otb x520  otb x20 ea  otb x20 ea    OTB  20 ea  GTB   20x ea        TB IR/ER  otb x10 IR  Peach tb x15 ER  OTB x 10  PTB x 15  otb x10  PTB 15  otb x15 IR, PTB x15 ER  otb x15 IR;  PTB x15 ER  IR:  OTBx15  ER:  PTBx15 OTB   20x ea       TB triceps ext  otb x20  OTB x 20  otb x20  otb x20  otb x20  OTB  20 GTB  20x ea        Wall slides  10"x10  10x 10"  10"x10  5"x15  5"x15  5"x15 5"x15        Standing flex/scap AROM @mirror  10 ea   10 ea  10 ea   x10ea w/scap assistance  10 ea  w scap assistance  10 ea 1#   10x ea        UT Stretch   3 x 30"  np  np     -            Modalities   7/12 7/19  8/7               CP PRN 10'  np  np                 Assessment: Tolerated treatment well, including progressions as noted  Patient demonstrated fatigue post treatment, exhibited good technique with therapeutic exercises and would benefit from continued PT to further improve strength and ROM  Plan: Continue per plan of care  Progress treament per protocol       Irlanda Encinas, PTA

## 2018-08-22 ENCOUNTER — OFFICE VISIT (OUTPATIENT)
Dept: PHYSICAL THERAPY | Facility: REHABILITATION | Age: 68
End: 2018-08-22
Payer: COMMERCIAL

## 2018-08-22 DIAGNOSIS — Z96.611 AFTERCARE FOLLOWING RIGHT SHOULDER JOINT REPLACEMENT SURGERY: ICD-10-CM

## 2018-08-22 DIAGNOSIS — M19.011 PRIMARY OSTEOARTHRITIS OF RIGHT SHOULDER: ICD-10-CM

## 2018-08-22 DIAGNOSIS — Z96.611 H/O TOTAL SHOULDER REPLACEMENT, RIGHT: Primary | ICD-10-CM

## 2018-08-22 DIAGNOSIS — Z96.611 STATUS POST TOTAL REPLACEMENT OF RIGHT SHOULDER: ICD-10-CM

## 2018-08-22 DIAGNOSIS — Z47.1 AFTERCARE FOLLOWING RIGHT SHOULDER JOINT REPLACEMENT SURGERY: ICD-10-CM

## 2018-08-22 PROCEDURE — 97140 MANUAL THERAPY 1/> REGIONS: CPT

## 2018-08-22 PROCEDURE — G8991 OTHER PT/OT GOAL STATUS: HCPCS

## 2018-08-22 PROCEDURE — G8990 OTHER PT/OT CURRENT STATUS: HCPCS

## 2018-08-22 PROCEDURE — 97112 NEUROMUSCULAR REEDUCATION: CPT

## 2018-08-22 NOTE — PROGRESS NOTES
Daily Note     Today's date: 2018  Patient name: Britton Pérez  : 1950  MRN: [id number]  Referring provider: Sandoval Salgado MD  Dx:   Encounter Diagnosis     ICD-10-CM    1  H/O total shoulder replacement, right Z96 611    2  Aftercare following right shoulder joint replacement surgery Z47 1     Z96 611    3  Primary osteoarthritis of right shoulder M19 011    4  Status post total replacement of right shoulder Z96 611                   Subjective: Pt reported increased soreness since LV  Objective: See treatment diary below  Manual   7/12 7/16  7/19  8/7  8/10  8/13  8/16  8/22     R shoulder PROM  TK    TK  TP  Tk  MD  SH TK       post/inf GH JM's  TP    MP  TP  TK  MD  -        scapular ROM        np  np            ISTM to UT   EB  np  np  np                                     Exercise Diary   7/12 7/16  7/19  8/7  8/10  8/13 8/16   8/22      biceps curls  2#x20    2#x20  2#x20  2#x20  2#  20 3#  20x 3#  20       towel circles cw/ccw  15 ea     15 ea   15ea  15 ea   15 ea Ball  20x ea  Ball  15 ea        RS-supine 90*flex        30"x2    ---  -        pulleys flex  10"x3' ea   3' flex  3' scap  3'flex and 3' scap  flex/scap 3'ea  flex/scap 3' ea   flex/scap  3' ea Flex/scap 3' ea  Flex/scap 3' ea        wall climbs 10"x10  10 x 10" 10"x10  10"x10  10"x10  10"x10 HEP  hep       scap punches  1#  3"x15  1 #  3" x 20  2# 3"x20  2#, 3"x20  2#; 3"x20  2#  3"x20  3#  3"x20 3#  3"x20       scap abcs  1# x1  1# x 1  2#x1  2#x1  2#x1  2#  x1  3# 1x  3#x1      S/L ER  20  1# x 20  2#x20  2#x20  2#x20  2#  20 3# 20x 3# x20       shrugs  2#x20    2#x20  2#x20  2#x20  2#  20 HEP  hep       TB LPD/rows  otb x20 ea   OTB x 20  otb x520  otb x20 ea  otb x20 ea    OTB  20 ea  GTB   20x ea  gtb x20 ea      TB IR/ER  otb x10 IR  Peach tb x15 ER  OTB x 10  PTB x 15  otb x10  PTB 15  otb x15 IR, PTB x15 ER  otb x15 IR;  PTB x15 ER  IR:  OTBx15  ER:  PTBx15 OTB   20x ea otb x20 ea       TB triceps ext  otb x20  OTB x 20  otb x20  otb x20  otb x20  OTB  20 GTB  20x ea  gtb x20      Wall slides  10"x10  10x 10"  10"x10  5"x15  5"x15  5"x15 5"x15  5"x15      Standing flex/scap AROM @mirror  10 ea   10 ea  10 ea   x10ea w/scap assistance  10 ea  w scap assistance  10 ea 1#   10x ea   1#  10 ea      UT Stretch   3 x 30"  np  np     -            Modalities   7/12 7/19  8/7               CP PRN 10'  np  np                      Assessment: Tolerated treatment well  Patient demonstrated fatigue post treatment and exhibited good technique with therapeutic exercises  Vc's needed for correct technique  No reports of increased pain post session  Plan: Continue per plan of care

## 2018-08-24 ENCOUNTER — OFFICE VISIT (OUTPATIENT)
Dept: PHYSICAL THERAPY | Facility: REHABILITATION | Age: 68
End: 2018-08-24
Payer: COMMERCIAL

## 2018-08-24 DIAGNOSIS — Z96.611 STATUS POST TOTAL REPLACEMENT OF RIGHT SHOULDER: ICD-10-CM

## 2018-08-24 DIAGNOSIS — Z47.1 AFTERCARE FOLLOWING RIGHT SHOULDER JOINT REPLACEMENT SURGERY: Primary | ICD-10-CM

## 2018-08-24 DIAGNOSIS — Z96.611 AFTERCARE FOLLOWING RIGHT SHOULDER JOINT REPLACEMENT SURGERY: Primary | ICD-10-CM

## 2018-08-24 PROCEDURE — 97112 NEUROMUSCULAR REEDUCATION: CPT | Performed by: PHYSICAL THERAPIST

## 2018-08-24 PROCEDURE — 97140 MANUAL THERAPY 1/> REGIONS: CPT | Performed by: PHYSICAL THERAPIST

## 2018-08-24 NOTE — PROGRESS NOTES
Daily Note     Today's date: 2018  Patient name: Carmen Starr  : 1950  MRN: [id number]  Referring provider: Yobany Coleman MD  Dx:   Encounter Diagnosis     ICD-10-CM    1  Aftercare following right shoulder joint replacement surgery Z47 1     Z96 611    2  Status post total replacement of right shoulder Z96 611               1on1 entire session  Subjective: Pt reports decreased soreness in shoulder  Objective: See treatment diary below  Manual   7/12 7/16  7/19  8/7  8/10  8/13  8/16  8/22  8/24   R shoulder PROM  TK    TK  TP  Tk  MD  SH TK   TP    post/inf GH JM's  TP    MP  TP  TK  MD  -    TP    scapular ROM        np  np            ISTM to UT   EB  np  np  np            PNF D1+D2 flex/ext-supine                  x5ea       Exercise Diary   7/12 7/16  7/19  8/7  8/10  8/13 8/16   8/22  8/24    biceps curls  2#x20    2#x20  2#x20  2#x20  2#  20 3#  20x 3#  20  3#x20     towel circles cw/ccw  15 ea     15 ea   15ea  15 ea   15 ea Ball  20x ea  Ball  15 ea   grn ball x20ea     RS-supine 90*flex        30"x2    ---  -   NP     pulleys flex  10"x3' ea   3' flex  3' scap  3'flex and 3' scap  flex/scap 3'ea  flex/scap 3' ea   flex/scap  3' ea Flex/scap 3' ea  Flex/scap 3' ea    flex/scap x3'    wall climbs 10"x10  10 x 10" 10"x10  10"x10  10"x10  10"x10 HEP  hep   HEP    scap punches  1#  3"x15  1 #  3" x 20  2# 3"x20  2#, 3"x20  2#; 3"x20  2#  3"x20  3#  3"x20 3#  3"x20  3#, 3"x20     scap abcs  1# x1  1# x 1  2#x1  2#x1  2#x1  2#  x1  3# 1x  3#x1  3#x1    S/L ER  20  1# x 20  2#x20  2#x20  2#x20  2#  20 3# 20x 3# x20  3#x20     shrugs  2#x20    2#x20  2#x20  2#x20  2#  20 HEP  hep   3#x20    TB LPD/rows  otb x20 ea   OTB x 20  otb x520  otb x20 ea  otb x20 ea    OTB  20 ea  GTB   20x ea  gtb x20 ea gtb x20ea     TB IR/ER  otb x10 IR  Peach tb x15 ER  OTB x 10  PTB x 15  otb x10  PTB 15  otb x15 IR, PTB x15 ER  otb x15 IR;  PTB x15 ER  IR:  OTBx15  ER:  PTBx15 OTB   20x ea otb x20 ea   otb x20ea    TB triceps ext  otb x20  OTB x 20  otb x20  otb x20  otb x20  OTB  20 GTB  20x ea  gtb x20  gtb x20    Wall slides  10"x10  10x 10"  10"x10  5"x15  5"x15  5"x15 5"x15  5"x15   5"x15   Standing flex/scap AROM @mirror  10 ea   10 ea  10 ea   x10ea w/scap assistance  10 ea  w scap assistance  10 ea 1#   10x ea   1#  10 ea  1#x10ea    Prone shld ext         3#x15   Prone T         x15   Prone rows         3#x15   UT Stretch   3 x 30"  np  np     -            Modalities   7/12 7/19  8/7               CP PRN 10'  np  np                     Assessment: Tolerated treatment well  Good tolerance to progressions this session  No pain reported during or after session  Patient would benefit from continued PT      Plan: Continue per plan of care

## 2018-08-27 ENCOUNTER — OFFICE VISIT (OUTPATIENT)
Dept: PHYSICAL THERAPY | Facility: REHABILITATION | Age: 68
End: 2018-08-27
Payer: COMMERCIAL

## 2018-08-27 DIAGNOSIS — M19.011 PRIMARY OSTEOARTHRITIS OF RIGHT SHOULDER: ICD-10-CM

## 2018-08-27 DIAGNOSIS — Z96.611 AFTERCARE FOLLOWING RIGHT SHOULDER JOINT REPLACEMENT SURGERY: Primary | ICD-10-CM

## 2018-08-27 DIAGNOSIS — Z96.611 H/O TOTAL SHOULDER REPLACEMENT, RIGHT: ICD-10-CM

## 2018-08-27 DIAGNOSIS — Z47.1 AFTERCARE FOLLOWING RIGHT SHOULDER JOINT REPLACEMENT SURGERY: Primary | ICD-10-CM

## 2018-08-27 DIAGNOSIS — Z96.611 STATUS POST TOTAL REPLACEMENT OF RIGHT SHOULDER: ICD-10-CM

## 2018-08-27 PROCEDURE — 97112 NEUROMUSCULAR REEDUCATION: CPT

## 2018-08-27 PROCEDURE — 97140 MANUAL THERAPY 1/> REGIONS: CPT

## 2018-08-27 NOTE — PROGRESS NOTES
Daily Note     Today's date: 2018  Patient name: Jose C Hill  : 1950  MRN: [id number]  Referring provider: Mikki Anna MD  Dx:   Encounter Diagnosis     ICD-10-CM    1  Aftercare following right shoulder joint replacement surgery Z47 1     Z96 611    2  Status post total replacement of right shoulder Z96 611    3  H/O total shoulder replacement, right Z96 611    4  Primary osteoarthritis of right shoulder M19 011                   Subjective: Pt reported back > shoulder pain today  She has a referral from the MD to start treatment for her back  Objective: See treatment diary below  Manual              R shoulder PROM  TK            post/inf GH JM's np            scapular ROM dc                 ISTM to UT  dc                PNF D1+D2 flex/ext-supine                        Exercise Diary               biceps curls  3#x20             towel circles cw/ccw  grn ball x15 ea              RS-supine 90*flex  np              pulleys flex  10"x3' ea (flex/scap)             wall climbs HEP            scap punches  3#  3"x20             scap abcs  3# x1             S/L ER 3# x20             shrugs  3#x20             TB LPD/rows  gtb x20 ea             TB IR/ER  gtb x20 IR/ER              TB triceps ext  gtb x20            Wall slides  5"x15            Standing flex/scap AROM @mirror  1# x10 ea             Prone shld ext 3# x15                  Prone T 15                  Prone rows  3# x15                 UT Stretch          -            Modalities     8/7               CP PRN 10'  np  np                      Assessment: Tolerated treatment well  Patient demonstrated fatigue post treatment and exhibited good technique with therapeutic exercises  VC's needed for correct technique throughout session  No reports of increased pain post session  Plan: Continue per plan of care

## 2018-08-30 ENCOUNTER — EVALUATION (OUTPATIENT)
Dept: PHYSICAL THERAPY | Facility: REHABILITATION | Age: 68
End: 2018-08-30
Payer: COMMERCIAL

## 2018-08-30 DIAGNOSIS — M54.16 LUMBAR RADICULOPATHY: Primary | ICD-10-CM

## 2018-08-30 PROCEDURE — 97110 THERAPEUTIC EXERCISES: CPT | Performed by: PHYSICAL THERAPIST

## 2018-08-30 PROCEDURE — 97162 PT EVAL MOD COMPLEX 30 MIN: CPT | Performed by: PHYSICAL THERAPIST

## 2018-08-30 PROCEDURE — G8990 OTHER PT/OT CURRENT STATUS: HCPCS | Performed by: PHYSICAL THERAPIST

## 2018-08-30 PROCEDURE — G8991 OTHER PT/OT GOAL STATUS: HCPCS | Performed by: PHYSICAL THERAPIST

## 2018-08-30 NOTE — LETTER
2018    [address]  [address] [place] [address]    Patient: Nitish Garner   YOB: 1950   Date of Visit: 2018     Encounter Diagnosis     ICD-10-CM    1  Lumbar radiculopathy M54 16        Dear Dr Dick Bui Recipients:    Please review the attached Plan of Care from Roxane Henderson's recent visit  Please verify that you agree therapy should continue by signing the attached document and sending it back to our office  If you have any questions or concerns, please don't hesitate to call  Sincerely,    Diony Carrera, PT      Referring Provider:      I certify that I have read the below Plan of Care and certify the need for these services furnished under this plan of treatment while under my care  [address]  [facility]  [address]  [phone]  VIA Facsimile: [phone]          PT Evaluation     Today's date: 2018  Patient name: Nitish Garner  : 1950  MRN: [id number]  Referring provider: Michael Moon MD  Dx:   Encounter Diagnosis     ICD-10-CM    1  Lumbar radiculopathy M54 16                   Assessment  Impairments: abnormal or restricted ROM, activity intolerance, impaired physical strength, lacks appropriate home exercise program, pain with function and poor posture     Assessment details: Patient presents with pain, decreased lumbar and hip ROM, decreased LE and core strength, and decreased function secondary to lumbar radiculopathy and L hip OA  Patient would benefit from skilled PT intervention to address these issues and to maximize function    Thank you for the referral   Understanding of Dx/Px/POC: good   Prognosis: good    Goals  Short Term:  Pt will report decreased levels of pain by at least 2 subjective ratings in 4 weeks  Pt will demonstrate improved ROM by at least 25% in 4 weeks  Pt will demonstrate improved strength by 1/2 grade MMT in 4 weeks  Long Term:   Pt will be independent in their HEP in 8 weeks  Pt will demonstrate improved FOTO, > 65  Pt will be independent with all ADL's  Pt will be able to return to golf with minimal pain    Plan  Patient would benefit from: skilled physical therapy  Planned modality interventions: cryotherapy and thermotherapy: hydrocollator packs  Planned therapy interventions: abdominal trunk stabilization, joint mobilization, manual therapy, neuromuscular re-education, patient education, postural training, body mechanics training, therapeutic activities, therapeutic exercise and home exercise program  Frequency: 2x week  Duration in weeks: 8  Plan of Care beginning date: 2018  Plan of Care expiration date: 10/25/2018  Treatment plan discussed with: patient  Plan details: Pt was educated in [employer]  Subjective Evaluation    History of Present Illness  Mechanism of injury: Pt is a 70 y o female with a c/o ongoing low back pain since last November of insidious onset  Pt states she mostly had L groin pain at that time  Pt saw MD and was referred for PT for her hip  Pt had treatment for her hip, but her symptoms persisted  Pt saw another MD in  and had an injection without relief  Pt then had a second injection early August, which helped  Pt notes she did have an MRI, which showed 2 bulging discs and pinched nerves at L2 and L3  Pt is now referred for PT for her lumbar spine  Pt reports pain/difficulty with prolonged ambulation >20 mins, sit to stand transfers, prolonged standing, steps (step to pattern with railing), bending down (bends at the waist), prolonged sitting  Pt would like to resume golfing also      Pain  At best pain rating: 3  At worst pain ratin  Location: lumbar spine, L groin and L knee and at times anterior L thigh  Relieving factors: rest and medications      Diagnostic Tests  MRI studies: abnormal  Treatments  Previous treatment: injection treatment and medication  Current treatment: physical therapy  Patient Goals  Patient goals for therapy: decreased pain, increased strength, increased motion, independence with ADLs/IADLs and return to sport/leisure activities          Objective     Special Questions      Additional Special Questions  Patient denies loss of bowel/bladder control, urine retention, saddle anesthesia, unexplained weight loss  Neurological Testing     Reflexes   Left   Patellar (L4): brisk (3+)  Achilles (S1): normal (2+)    Right   Patellar (L4): normal (2+)  Achilles (S1): normal (2+)    Additional Neurological Details  Pt denies N/T    Active Range of Motion     Additional Active Range of Motion Details  L/S flex=wfl with pain and L knee pain; repeated=no change  Ext=50% with pressure in low back; repeated=pain in L groin and L knee  RSB=wfl without pain; LSB=50% with LBP;  R Rot=75% and L rot=75%  Passive Range of Motion     Additional Passive Range of Motion Details  Pt has decreased PROM of the hip joint, IR f/b flex  Strength/Myotome Testing     Left Hip   Planes of Motion   Flexion: 3+  Extension: 4-  Abduction: 3+    Right Hip   Planes of Motion   Flexion: 4+  Extension: 4  Abduction: 4-    Left Knee   Flexion: 4+  Extension: 4-    Right Knee   Flexion: 5  Extension: 5    Left Ankle/Foot   Dorsiflexion: 5  Great toe extension: 5    Right Ankle/Foot   Dorsiflexion: 5  Great toe extension: 5    Muscle Activation     Additional Muscle Activation Details  Decreased activation TA and multifidus  Tests       Thoracic   Negative slump  Lumbar   Positive prone instability   Negative slump  Left   Positive repeated side glide  Negative crossed SLR and passive SLR  Right   Negative crossed SLR, passive SLR and repeated side glide  Right Pelvic Girdle/Sacrum   Negative: sacral spring  Additional Tests Details  (-)LUIS on R (+)LUIS on L, (+)Scour on L , (-)ASLR b/l   (-)supine to sit for LLD  (-)SI compression/distraction    No pelvic discrepancy noted in supine, prone or in standing  LAD=relief on L  Precautions: Oa, HTN, history of thyroid cancer and thyroid surgery, HTN, chronic back pain, R TSA    Daily Treatment Diary     Manual              L LAD             Figure 4 L hip PA JM             Prone L hip IR stretching             L2-3 gapping PRN                              Exercise Diary              TM amb             DLS march             DLS kicks             DLS bridges             DLS SLR flex             Clamshells             TB LPD             TB rows             TB multifidus             Mini squats             Side stepping                                                                                                                                      Modalities              MH/CP PRN

## 2018-08-30 NOTE — PROGRESS NOTES
PT Evaluation     Today's date: 2018  Patient name: Pee Watkins  : 1950  MRN: [id number]  Referring provider: Carlos Dueñas MD  Dx:   Encounter Diagnosis     ICD-10-CM    1  Lumbar radiculopathy M54 16                   Assessment  Impairments: abnormal or restricted ROM, activity intolerance, impaired physical strength, lacks appropriate home exercise program, pain with function and poor posture     Assessment details: Patient presents with pain, decreased lumbar and hip ROM, decreased LE and core strength, and decreased function secondary to lumbar radiculopathy and L hip OA  Patient would benefit from skilled PT intervention to address these issues and to maximize function  Thank you for the referral   Understanding of Dx/Px/POC: good   Prognosis: good    Goals  Short Term:  Pt will report decreased levels of pain by at least 2 subjective ratings in 4 weeks  Pt will demonstrate improved ROM by at least 25% in 4 weeks  Pt will demonstrate improved strength by 1/2 grade MMT in 4 weeks  Long Term:   Pt will be independent in their HEP in 8 weeks  Pt will demonstrate improved FOTO, > 65  Pt will be independent with all ADL's  Pt will be able to return to golf with minimal pain    Plan  Patient would benefit from: skilled physical therapy  Planned modality interventions: cryotherapy and thermotherapy: hydrocollator packs  Planned therapy interventions: abdominal trunk stabilization, joint mobilization, manual therapy, neuromuscular re-education, patient education, postural training, body mechanics training, therapeutic activities, therapeutic exercise and home exercise program  Frequency: 2x week  Duration in weeks: 8  Plan of Care beginning date: 2018  Plan of Care expiration date: 10/25/2018  Treatment plan discussed with: patient  Plan details: Pt was educated in [employer]            Subjective Evaluation    History of Present Illness  Mechanism of injury: Pt is a 70 y o female with a c/o ongoing low back pain since last November of insidious onset  Pt states she mostly had L groin pain at that time  Pt saw MD and was referred for PT for her hip  Pt had treatment for her hip, but her symptoms persisted  Pt saw another MD in  and had an injection without relief  Pt then had a second injection early August, which helped  Pt notes she did have an MRI, which showed 2 bulging discs and pinched nerves at L2 and L3  Pt is now referred for PT for her lumbar spine  Pt reports pain/difficulty with prolonged ambulation >20 mins, sit to stand transfers, prolonged standing, steps (step to pattern with railing), bending down (bends at the waist), prolonged sitting  Pt would like to resume golfing also  Pain  At best pain rating: 3  At worst pain ratin  Location: lumbar spine, L groin and L knee and at times anterior L thigh  Relieving factors: rest and medications      Diagnostic Tests  MRI studies: abnormal  Treatments  Previous treatment: injection treatment and medication  Current treatment: physical therapy  Patient Goals  Patient goals for therapy: decreased pain, increased strength, increased motion, independence with ADLs/IADLs and return to sport/leisure activities          Objective     Special Questions      Additional Special Questions  Patient denies loss of bowel/bladder control, urine retention, saddle anesthesia, unexplained weight loss  Neurological Testing     Reflexes   Left   Patellar (L4): brisk (3+)  Achilles (S1): normal (2+)    Right   Patellar (L4): normal (2+)  Achilles (S1): normal (2+)    Additional Neurological Details  Pt denies N/T    Active Range of Motion     Additional Active Range of Motion Details  L/S flex=wfl with pain and L knee pain; repeated=no change  Ext=50% with pressure in low back; repeated=pain in L groin and L knee  RSB=wfl without pain; LSB=50% with LBP;  R Rot=75% and L rot=75%        Passive Range of Motion     Additional Passive Range of Motion Details  Pt has decreased PROM of the hip joint, IR f/b flex  Strength/Myotome Testing     Left Hip   Planes of Motion   Flexion: 3+  Extension: 4-  Abduction: 3+    Right Hip   Planes of Motion   Flexion: 4+  Extension: 4  Abduction: 4-    Left Knee   Flexion: 4+  Extension: 4-    Right Knee   Flexion: 5  Extension: 5    Left Ankle/Foot   Dorsiflexion: 5  Great toe extension: 5    Right Ankle/Foot   Dorsiflexion: 5  Great toe extension: 5    Muscle Activation     Additional Muscle Activation Details  Decreased activation TA and multifidus  Tests       Thoracic   Negative slump  Lumbar   Positive prone instability   Negative slump  Left   Positive repeated side glide  Negative crossed SLR and passive SLR  Right   Negative crossed SLR, passive SLR and repeated side glide  Right Pelvic Girdle/Sacrum   Negative: sacral spring  Additional Tests Details  (-)LUIS on R (+)LUIS on L, (+)Scour on L , (-)ASLR b/l   (-)supine to sit for LLD  (-)SI compression/distraction  No pelvic discrepancy noted in supine, prone or in standing  LAD=relief on L  Precautions: Oa, HTN, history of thyroid cancer and thyroid surgery, HTN, chronic back pain, R TSA    Daily Treatment Diary     Manual              L LAD             Figure 4 L hip PA JM             Prone L hip IR stretching             L2-3 gapping PRN                              Exercise Diary              TM amb             DLS march             DLS kicks             DLS bridges             DLS SLR flex             Clamshells             TB LPD             TB rows             TB multifidus             Mini squats             Side stepping                                                                                                                                      Modalities              MH/CP PRN

## 2018-08-31 ENCOUNTER — TRANSCRIBE ORDERS (OUTPATIENT)
Dept: PHYSICAL THERAPY | Facility: REHABILITATION | Age: 68
End: 2018-08-31

## 2018-08-31 DIAGNOSIS — G89.29 CHRONIC LEFT SI JOINT PAIN: Primary | ICD-10-CM

## 2018-08-31 DIAGNOSIS — M54.16 LEFT LUMBAR RADICULOPATHY: ICD-10-CM

## 2018-08-31 DIAGNOSIS — M53.3 CHRONIC LEFT SI JOINT PAIN: Primary | ICD-10-CM

## 2018-09-04 ENCOUNTER — OFFICE VISIT (OUTPATIENT)
Dept: PHYSICAL THERAPY | Facility: REHABILITATION | Age: 68
End: 2018-09-04
Payer: COMMERCIAL

## 2018-09-04 DIAGNOSIS — M54.16 LUMBAR RADICULOPATHY: Primary | ICD-10-CM

## 2018-09-04 DIAGNOSIS — Z47.1 AFTERCARE FOLLOWING RIGHT SHOULDER JOINT REPLACEMENT SURGERY: ICD-10-CM

## 2018-09-04 DIAGNOSIS — Z96.611 AFTERCARE FOLLOWING RIGHT SHOULDER JOINT REPLACEMENT SURGERY: ICD-10-CM

## 2018-09-04 PROCEDURE — 97112 NEUROMUSCULAR REEDUCATION: CPT | Performed by: PHYSICAL THERAPIST

## 2018-09-04 PROCEDURE — 97110 THERAPEUTIC EXERCISES: CPT | Performed by: PHYSICAL THERAPIST

## 2018-09-04 PROCEDURE — 97140 MANUAL THERAPY 1/> REGIONS: CPT | Performed by: PHYSICAL THERAPIST

## 2018-09-04 NOTE — PROGRESS NOTES
Daily Note     Today's date: 2018  Patient name: Shira Bar  : 1950  MRN: [id number]  Referring provider: José Miguel Salomon MD  Dx:   Encounter Diagnosis     ICD-10-CM    1  Lumbar radiculopathy M54 16            1on1 entire session  Subjective: Pt reports soreness lumbar spine, L groin, anterior thigh and L knee the day after IE  Pt states HEP is going ok        Objective: See treatment diary below  Manual                        L LAD  TP                     Figure 4 L hip PA JM  TP                     Prone L hip IR stretching  TP                     L2-3 gapping PRN  TP                      R shld PROM, post/inf GH JM's  TP                           Exercise Diary                        TM amb  10'                     DLS                      DLS kicks  45                     DLS bridges  4"B81                     DLS SLR flex  Q65                     Clamshells  x15ea                     TB LPD  gtb 2x10                     TB rows  gtb 2x10                     TB multifidus  otb x10ea                     Mini squats  10                     Side stepping  2 laps                                                                                                                                                                                                                                                Exercise Diary                      biceps curls  3#x20  np                  towel circles cw/ccw  grn ball x15 ea   np                  RS-supine 90*flex  np  np                  pulleys flex  10"x3' ea (flex/scap)  np                                        scap punches  3#  3"x20  3# 3"x20                  scap abcs  3# x1  3#x1                  S/L ER 3# x20 3#x20                  shrugs  3#x20 np                   TB LPD/rows  gtb x20 ea   --                  TB IR/ER  gtb x20 IR/ER    gtb x20 IR/ER                 TB triceps ext  gtb x20 gtb x20                  Wall slides  5"x15  np                 Standing flex/scap AROM @mirror  1# x10 ea   np                 Prone shld ext 3# x15  3#x15                  Prone T 15   15                 Prone rows  3# x15  3#x15                              -              Modalities                        MH/CP PRN                                                                             Assessment: Tolerated treatment well  Patient demonstrated fatigue post treatment  Not all shoulder TE's performed due to initiating new TE's for her lumbar radiculopathy  No significant pain post session  Monitor response NV  Plan: Continue per plan of care

## 2018-09-06 ENCOUNTER — OFFICE VISIT (OUTPATIENT)
Dept: PHYSICAL THERAPY | Facility: REHABILITATION | Age: 68
End: 2018-09-06
Payer: COMMERCIAL

## 2018-09-06 DIAGNOSIS — Z96.611 STATUS POST TOTAL REPLACEMENT OF RIGHT SHOULDER: ICD-10-CM

## 2018-09-06 DIAGNOSIS — Z96.611 AFTERCARE FOLLOWING RIGHT SHOULDER JOINT REPLACEMENT SURGERY: ICD-10-CM

## 2018-09-06 DIAGNOSIS — M54.16 LUMBAR RADICULOPATHY: Primary | ICD-10-CM

## 2018-09-06 DIAGNOSIS — Z47.1 AFTERCARE FOLLOWING RIGHT SHOULDER JOINT REPLACEMENT SURGERY: ICD-10-CM

## 2018-09-06 DIAGNOSIS — M19.011 PRIMARY OSTEOARTHRITIS OF RIGHT SHOULDER: ICD-10-CM

## 2018-09-06 DIAGNOSIS — Z96.611 H/O TOTAL SHOULDER REPLACEMENT, RIGHT: ICD-10-CM

## 2018-09-06 PROCEDURE — 97110 THERAPEUTIC EXERCISES: CPT

## 2018-09-06 PROCEDURE — 97112 NEUROMUSCULAR REEDUCATION: CPT

## 2018-09-06 PROCEDURE — 97140 MANUAL THERAPY 1/> REGIONS: CPT

## 2018-09-06 NOTE — PROGRESS NOTES
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    4  H/O total shoulder replacement, right Z96 611    5  Primary osteoarthritis of right shoulder M19 011                   Subjective: Pt reported L > R leg discomfort  No changes in back symptoms  Shoulder has been feeling okay        Objective: See treatment diary below  Manual                      L LAD  TP  Tk                   Figure 4 L hip PA JM  TP  TP                   Prone L hip IR stretching  TP  TK                   L2-3 gapping PRN  TP  TP                    R shld PROM, post/inf GH JM's  TP  TK                         Exercise Diary                      TM amb  10'  10'                   DLS   27 ea                   DLS kicks  69  53 ea                   DLS bridges  4"W38  6"D54                   DLS SLR flex  Q49  09                   Clamshells  x15ea  15 ea                   TB LPD  gtb 2x10  gtb x20                   TB rows  gtb 2x10  gtb x20                   TB multifidus  otb x10ea  otb x10 ea                    Mini squats  10  10                   Side stepping  2 laps  x2                                                                                                                                                                                                                                              Exercise Diary                    biceps curls  3#x20  np  np                towel circles cw/ccw  grn ball x15 ea   np  np                RS-supine 90*flex  np  np  np                pulleys flex  10"x3' ea (flex/scap)  np  np                                         scap punches  3#  3"x20  3# 3"x20  3#  3"x20                scap abcs  3# x1  3#x1 3# x1                 S/L ER 3# x20 3#x20 3#x20                 shrugs  3#x20 np  np                 TB LPD/rows  gtb x20 ea   --                  TB IR/ER  gtb x20 IR/ER    gtb x20 IR/ER  gtb x20 IR/ER               TB triceps ext  gtb x20 gtb x20  gtb x20                Wall slides  5"x15  np  np               Standing flex/scap AROM @mirror  1# x10 ea   np  np               Prone shld ext 3# x15  3#x15   3# x15               Prone T 15   15  15               Prone rows  3# x15  3#x15  3# x15                             -                 Modalities                        MH/CP PRN                                                                              Assessment: Tolerated treatment well  Patient demonstrated fatigue post treatment and exhibited good technique with therapeutic exercises  VC's needed for correct technique throughout session  Discomfort noted with some back exercises  Improvement noted post session  Plan: Continue per plan of care

## 2018-09-13 ENCOUNTER — OFFICE VISIT (OUTPATIENT)
Dept: PHYSICAL THERAPY | Facility: REHABILITATION | Age: 68
End: 2018-09-13
Payer: COMMERCIAL

## 2018-09-13 DIAGNOSIS — M54.16 LUMBAR RADICULOPATHY: Primary | ICD-10-CM

## 2018-09-13 DIAGNOSIS — Z96.611 AFTERCARE FOLLOWING RIGHT SHOULDER JOINT REPLACEMENT SURGERY: ICD-10-CM

## 2018-09-13 DIAGNOSIS — Z47.1 AFTERCARE FOLLOWING RIGHT SHOULDER JOINT REPLACEMENT SURGERY: ICD-10-CM

## 2018-09-13 PROCEDURE — 97112 NEUROMUSCULAR REEDUCATION: CPT

## 2018-09-13 PROCEDURE — 97110 THERAPEUTIC EXERCISES: CPT

## 2018-09-13 PROCEDURE — 97140 MANUAL THERAPY 1/> REGIONS: CPT

## 2018-09-13 NOTE — PROGRESS NOTES
Daily Note     Today's date: 2018  Patient name: Adriana Olivo  : 1950  MRN: [id number]  Referring provider: Quique Morin MD  Dx:   Encounter Diagnosis     ICD-10-CM    1  Lumbar radiculopathy M54 16    2  Aftercare following right shoulder joint replacement surgery Z47 1     Z96 611                   Subjective: Pt continues to have L sided groin/hip pain, 4-5/10 at arrival  Pt also complains about L knee pain as well  Pain is worse in the morning but pt has been finding relief using a MHP when necessary  Denies LBP and R shoulder pain        Objective: See treatment diary below       Objective: See treatment diary below  Manual                    L LAD  TP  Tk  RADHA                 Figure 4 L hip PA JM  TP  TP                   Prone L hip IR stretching  TP  TK  np                 L2-3 gapping PRN  TP  TP                    R shld PROM, post/inf GH JM's  TP  TK  RADHA                        Exercise Diary                    TM amb  10'  10'  10'                  DLS   77 ea  10 ea                 DLS kicks  91  23 ea  10 ea                  DLS bridges  8"E11  3"N59  3'' x 10                 DLS SLR flex  L50  62  10                  Clamshells  x15ea  15 ea 15 ea                 TB LPD  gtb 2x10  gtb x20  gtb x 20                 TB rows  gtb 2x10  gtb x20  gtb x 20                 TB multifidus  otb x10ea  otb x10 ea   otb x 10 ea                 Mini squats  10  10  10                  Side stepping  2 laps  x2  x 2                                                                                                                                                                                                                                             Exercise Diary                  biceps curls  3#x20  np  np  np              towel circles cw/ccw  grn ball x15 ea   np  np  np              RS-supine 90*flex  np  np  np np               pulleys flex  10"x3' ea (flex/scap)  np  np  np                                       scap punches  3#  3"x20  3# 3"x20  3#  3"x20 3# 3'' x20               scap abcs  3# x1  3#x1 3# x1  3# x 1               S/L ER 3# x20 3#x20 3#x20  3# x20               shrugs  3#x20 np  np   np              TB LPD/rows  gtb x20 ea   --                  TB IR/ER  gtb x20 IR/ER    gtb x20 IR/ER  gtb x20 IR/ER gtb x 20 IR/ER              TB triceps ext  gtb x20 gtb x20  gtb x20   btb x 20             Wall slides  5"x15  np  np  np             Standing flex/scap AROM @mirror  1# x10 ea   np  np np              Prone shld ext 3# x15  3#x15   3# x15 3# x 15              Prone T 15   15  15 15              Prone rows  3# x15  3#x15  3# x15 3# x15                            -                 Modalities                        MH/CP PRN                                                                              Assessment: Tolerated treatment well  Pt demonstrates an antalgic gait, favoring her L LE  Pt may benefit from progression of TE bands nv for added challenge  Struggles most with L SLR flexion due to L groin  Pt did report noticeable relief following L LAD  Patient would benefit from continued PT For improved strength, flexibility and overall function  Plan: Continue per plan of care

## 2018-09-17 ENCOUNTER — OFFICE VISIT (OUTPATIENT)
Dept: PHYSICAL THERAPY | Facility: REHABILITATION | Age: 68
End: 2018-09-17
Payer: COMMERCIAL

## 2018-09-17 DIAGNOSIS — Z96.611 H/O TOTAL SHOULDER REPLACEMENT, RIGHT: ICD-10-CM

## 2018-09-17 DIAGNOSIS — M54.16 LUMBAR RADICULOPATHY: Primary | ICD-10-CM

## 2018-09-17 DIAGNOSIS — Z96.611 AFTERCARE FOLLOWING RIGHT SHOULDER JOINT REPLACEMENT SURGERY: ICD-10-CM

## 2018-09-17 DIAGNOSIS — Z47.1 AFTERCARE FOLLOWING RIGHT SHOULDER JOINT REPLACEMENT SURGERY: ICD-10-CM

## 2018-09-17 DIAGNOSIS — M19.011 PRIMARY OSTEOARTHRITIS OF RIGHT SHOULDER: ICD-10-CM

## 2018-09-17 DIAGNOSIS — Z96.611 STATUS POST TOTAL REPLACEMENT OF RIGHT SHOULDER: ICD-10-CM

## 2018-09-17 PROCEDURE — 97140 MANUAL THERAPY 1/> REGIONS: CPT

## 2018-09-17 PROCEDURE — 97112 NEUROMUSCULAR REEDUCATION: CPT

## 2018-09-17 PROCEDURE — 97110 THERAPEUTIC EXERCISES: CPT

## 2018-09-17 NOTE — PROGRESS NOTES
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    4  H/O total shoulder replacement, right Z96 611    5  Primary osteoarthritis of right shoulder M19 011                   Subjective: Pt continues to have L sided groin/hip pain, 4-5/10 at arrival  Pt also complains about L knee pain as well       Objective: See treatment diary below       Objective: See treatment diary below  Manual                  L LAD  TP  Tk  RADHA  MM               Figure 4 L hip PA JM  TP  TP                   Prone L hip IR stretching  TP  TK  np                 L2-3 gapping PRN  TP  TP                    R shld PROM, post/inf GH JM's  TP  TK [facility]  PROM MM                     Exercise Diary                  TM amb  10'  10'  10'   10'               DLS   45 ea  10 ea 10 ea               DLS kicks  33  36 ea  10 ea  10 ea               DLS bridges  4"F36  4"I47  3'' x 10  3" x10               DLS SLR flex  M64  65  10   10               Clamshells  x15ea  15 ea 15 ea  15 ea               TB LPD  gtb 2x10  gtb x20  gtb x 20  btb x20               TB rows  gtb 2x10  gtb x20  gtb x 20  gtb x25               TB multifidus  otb x10ea  otb x10 ea   otb x 10 ea  otb x10 ea               Mini squats  10  10  10   15               Side stepping  2 laps  x2  x 2   x2                                                                                                                                                                                                                                          Exercise Diary                biceps curls  3#x20  np  np  np np           towel circles cw/ccw  grn ball x15 ea   np  np  np  np            RS-supine 90*flex  np  np  np np  np             pulleys flex  10"x3' ea (flex/scap)  np  np  np np                                      scap punches  3#  3"x20  3# 3"x20  3#  3"x20 3# 3'' x20  3# 3" x20             scap abcs  3# x1  3#x1 3# x1  3# x 1  3# x1             S/L ER 3# x20 3#x20 3#x20  3# x20  3# x20             shrugs  3#x20 np  np   np np             TB LPD/rows  gtb x20 ea   --                  TB IR/ER  gtb x20 IR/ER    gtb x20 IR/ER  gtb x20 IR/ER gtb x 20 IR/ER   gtb x25 IR/ER           TB triceps ext  gtb x20 gtb x20  gtb x20   btb x 20  btb x20           Wall slides  5"x15  np  np  np np            Standing flex/scap AROM @mirror  1# x10 ea   np  np np   np           Prone shld ext 3# x15  3#x15   3# x15 3# x 15  3#x20            Prone T 15   15  15 15  15            Prone rows  3# x15  3#x15  3# x15 3# x15  3#x20                          -                 Modalities                        MH/CP PRN                                                                              Assessment: Tolerated treatment well having no increased symptoms throughout session  Pt did have relief with LAD this visit  Shoulder motions demonstrated a tight end feel in all directional but was able to show improved motion post manuals  Patient would benefit from continued PT For improved strength, flexibility and overall function  Plan: Continue per plan of care

## 2018-09-20 ENCOUNTER — OFFICE VISIT (OUTPATIENT)
Dept: PHYSICAL THERAPY | Facility: REHABILITATION | Age: 68
End: 2018-09-20
Payer: COMMERCIAL

## 2018-09-20 DIAGNOSIS — Z47.1 AFTERCARE FOLLOWING RIGHT SHOULDER JOINT REPLACEMENT SURGERY: ICD-10-CM

## 2018-09-20 DIAGNOSIS — M54.16 LUMBAR RADICULOPATHY: Primary | ICD-10-CM

## 2018-09-20 DIAGNOSIS — Z96.611 AFTERCARE FOLLOWING RIGHT SHOULDER JOINT REPLACEMENT SURGERY: ICD-10-CM

## 2018-09-20 PROCEDURE — 97140 MANUAL THERAPY 1/> REGIONS: CPT | Performed by: PHYSICAL THERAPIST

## 2018-09-20 PROCEDURE — 97112 NEUROMUSCULAR REEDUCATION: CPT | Performed by: PHYSICAL THERAPIST

## 2018-09-20 NOTE — PROGRESS NOTES
Daily Note     Today's date: 2018  Patient name: Brennan Chavez  : 1950  MRN: [id number]  Referring provider: Jessica Rodriguez MD  Dx:   Encounter Diagnosis     ICD-10-CM    1  Lumbar radiculopathy M54 16    2  Aftercare following right shoulder joint replacement surgery Z47 1     Z96 611            1on1 [id number] and performed remaining TE's as part of IEP  Subjective: Pt reports she had some soreness from helping her daughter clean her house yesterday, in her lumbar spine and shoulder, but does not feel bad today  Pt states she believes the lumbar exercises are helping        Objective: See treatment diary below    Manual                L LAD  TP  Tk  RADHA  MM  TP             Figure 4 L hip PA JM  TP  TP      TP             Prone L hip IR stretching  TP  TK  np    TP             L2-3 gapping PRN  TP  TP      np              R shld PROM, post/inf GH JM's  TP  TK  RADHA  PROM MM  TP                   Exercise Diary                TM amb  10'  10'  10'   10'  10'             DLS   71 ea  10 ea 10 ea  15ea             DLS kicks  11  31 ea  10 ea  10 ea  15ea             DLS bridges  9"M33  5"X97  3'' x 10  3" x10  3"x15             DLS SLR flex  C62  89  10   10  10ea             Clamshells  x15ea  15 ea 15 ea  15 ea  15ea             TB LPD  gtb 2x10  gtb x20  gtb x 20  btb x20  btb x20             TB rows  gtb 2x10  gtb x20  gtb x 20  gtb x25  btb x20             TB multifidus  otb x10ea  otb x10 ea   otb x 10 ea  otb x10 ea  otb x10ea             Mini squats  10  10  10   15  15             Side stepping  2 laps  x2  x 2   x2  x2                                                                                                                                                                                                                                        Exercise Diary              biceps curls  3#x20  np  np  np np  np           towel circles cw/ccw  grn ball x15 ea   np  np  np  np np           RS-supine 90*flex  np  np  np np  np  np           pulleys flex  10"x3' ea (flex/scap)  np  np  np np   np                                   scap punches  3#  3"x20  3# 3"x20  3#  3"x20 3# 3'' x20  3# 3" x20  3#, 3"x20           scap abcs  3# x1  3#x1 3# x1  3# x 1  3# x1   3#x1          S/L ER 3# x20 3#x20 3#x20  3# x20  3# x20  3#x20           shrugs  3#x20 np  np   np np  np           TB LPD/rows  gtb x20 ea   --        -          TB IR/ER  gtb x20 IR/ER    gtb x20 IR/ER  gtb x20 IR/ER gtb x 20 IR/ER   gtb x25 IR/ER gtb x25ea         TB triceps ext  gtb x20 gtb x20  gtb x20   btb x 20  btb x20 btb x20          Wall slides  5"x15  np  np  np np   np         Standing flex/scap AROM @mirror  1# x10 ea   np  np np   np np          Prone shld ext 3# x15  3#x15   3# x15 3# x 15  3#x20  3#x20          Prone T 15   15  15 15  15  15          Prone rows  3# x15  3#x15  3# x15 3# x15  3#x20  3#x20                        -                 Modalities                        MH/CP PRN                                                                           Assessment: Tolerated treatment well  Patient requires VC's and TC's for correct technique with several TE's  No pain reported during or after session  Plan: Continue per plan of care

## 2018-09-24 ENCOUNTER — OFFICE VISIT (OUTPATIENT)
Dept: PHYSICAL THERAPY | Facility: REHABILITATION | Age: 68
End: 2018-09-24
Payer: COMMERCIAL

## 2018-09-24 DIAGNOSIS — Z96.611 H/O TOTAL SHOULDER REPLACEMENT, RIGHT: ICD-10-CM

## 2018-09-24 DIAGNOSIS — Z96.611 STATUS POST TOTAL REPLACEMENT OF RIGHT SHOULDER: ICD-10-CM

## 2018-09-24 DIAGNOSIS — M54.16 LUMBAR RADICULOPATHY: Primary | ICD-10-CM

## 2018-09-24 DIAGNOSIS — M19.011 PRIMARY OSTEOARTHRITIS OF RIGHT SHOULDER: ICD-10-CM

## 2018-09-24 DIAGNOSIS — Z96.611 AFTERCARE FOLLOWING RIGHT SHOULDER JOINT REPLACEMENT SURGERY: ICD-10-CM

## 2018-09-24 DIAGNOSIS — Z47.1 AFTERCARE FOLLOWING RIGHT SHOULDER JOINT REPLACEMENT SURGERY: ICD-10-CM

## 2018-09-24 PROCEDURE — 97140 MANUAL THERAPY 1/> REGIONS: CPT

## 2018-09-24 PROCEDURE — G8991 OTHER PT/OT GOAL STATUS: HCPCS

## 2018-09-24 PROCEDURE — 97112 NEUROMUSCULAR REEDUCATION: CPT

## 2018-09-24 PROCEDURE — G8990 OTHER PT/OT CURRENT STATUS: HCPCS

## 2018-09-24 PROCEDURE — 97110 THERAPEUTIC EXERCISES: CPT

## 2018-09-24 NOTE — PROGRESS NOTES
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    4  H/O total shoulder replacement, right Z96 611    5  Primary osteoarthritis of right shoulder M19 011                   Subjective: Pt reported increased back and LE pain due to doing lots of yard work         Objective: See treatment diary below  Manual              L LAD  TP  Tk  RADHA  MM  TP  TC           Figure 4 L hip PA JM  TP  TP      TP  TP           Prone L hip IR stretching  TP  TK  np    TP  TP           L2-3 gapping PRN  TP  TP      np  TP            R shld PROM, post/inf GH JM's  TP  TK  RADHA  PROM MM  TP  TC                 Exercise Diary              TM amb  10'  10'  10'   10'  10'  10'           DLS march  59  76 ea  10 ea 10 ea  15ea  15 ea            DLS kicks  45  87 ea  10 ea  10 ea  15ea  15 ea            DLS bridges  3"V12  0"P93  3'' x 10  3" x10  3"x15  3"x15           DLS SLR flex  V63  15  10   10  10ea  10 ea            Clamshells  x15ea  15 ea 15 ea  15 ea  15ea  15 ea            TB LPD  gtb 2x10  gtb x20  gtb x 20  btb x20  btb x20  btb x20           TB rows  gtb 2x10  gtb x20  gtb x 20  gtb x25  btb x20  btb x20           TB multifidus  otb x10ea  otb x10 ea   otb x 10 ea  otb x10 ea  otb x10ea  otb x15 ea            Mini squats  10  10  10   15  15  15           Side stepping  2 laps  x2  x 2   x2  x2  x2                                                                                                                                                                                                                                      Exercise Diary     9/6 9/13 9/17 9/20 9/24        biceps curls  3#x20  np  np  np np  np   np        towel circles cw/ccw  grn ball x15 ea   np  np  np  np np   np        RS-supine 90*flex  np  np  np np  np  np   np        pulleys flex  10"x3' ea (flex/scap)  np  np  np np   np  np                                 scap punches  3#  3"x20  3# 3"x20  3#  3"x20 3# 3'' x20  3# 3" x20  3#, 3"x20   3# 3"x20        scap abcs  3# x1  3#x1 3# x1  3# x 1  3# x1   3#x1  3#x1        S/L ER 3# x20 3#x20 3#x20  3# x20  3# x20  3#x20  3#x20         shrugs  3#x20 np  np   np np  np   np        TB LPD/rows  gtb x20 ea   --        -          TB IR/ER  gtb x20 IR/ER    gtb x20 IR/ER  gtb x20 IR/ER gtb x 20 IR/ER   gtb x25 IR/ER gtb x25ea  gtb x25 ea        TB triceps ext  gtb x20 gtb x20  gtb x20   btb x 20  btb x20 btb x20   btb x20       Wall slides  5"x15  np  np  np np   np np        Standing flex/scap AROM @mirror  1# x10 ea   np  np np   np np   np       Prone shld ext 3# x15  3#x15   3# x15 3# x 15  3#x20  3#x20   3#x20       Prone T 15   15  15 15  15  15  15        Prone rows  3# x15  3#x15  3# x15 3# x15  3#x20  3#x20   3#x20                     -                 Modalities                        MH/CP PRN                                                                              Assessment: Tolerated treatment well  Patient demonstrated fatigue post treatment and exhibited good technique with therapeutic exercises  VC's needed for correct technique throughout session  Challenged with supine SLR flexion  Some relief noted post session  Plan: Continue per plan of care

## 2018-09-27 ENCOUNTER — OFFICE VISIT (OUTPATIENT)
Dept: PHYSICAL THERAPY | Facility: REHABILITATION | Age: 68
End: 2018-09-27
Payer: COMMERCIAL

## 2018-09-27 DIAGNOSIS — M54.16 LUMBAR RADICULOPATHY: Primary | ICD-10-CM

## 2018-09-27 DIAGNOSIS — Z96.611 H/O TOTAL SHOULDER REPLACEMENT, RIGHT: ICD-10-CM

## 2018-09-27 DIAGNOSIS — Z96.611 STATUS POST TOTAL REPLACEMENT OF RIGHT SHOULDER: ICD-10-CM

## 2018-09-27 DIAGNOSIS — Z47.1 AFTERCARE FOLLOWING RIGHT SHOULDER JOINT REPLACEMENT SURGERY: ICD-10-CM

## 2018-09-27 DIAGNOSIS — Z96.611 AFTERCARE FOLLOWING RIGHT SHOULDER JOINT REPLACEMENT SURGERY: ICD-10-CM

## 2018-09-27 DIAGNOSIS — M19.011 PRIMARY OSTEOARTHRITIS OF RIGHT SHOULDER: ICD-10-CM

## 2018-09-27 PROCEDURE — 97110 THERAPEUTIC EXERCISES: CPT

## 2018-09-27 PROCEDURE — 97140 MANUAL THERAPY 1/> REGIONS: CPT

## 2018-09-27 PROCEDURE — 97112 NEUROMUSCULAR REEDUCATION: CPT

## 2018-09-27 NOTE — PROGRESS NOTES
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    4  H/O total shoulder replacement, right Z96 611    5  Primary osteoarthritis of right shoulder M19 011                   Subjective: Pt reported intermittent back pain today  She also reported knee discomfort         Objective: See treatment diary below  Manual            L LAD  TP  Tk  RADHA  MM  TP  TC  TP         Figure 4 L hip PA JM  TP  TP      TP  TP  TP         Prone L hip IR stretching  TP  TK  np    TP  TP  TP         L2-3 gapping PRN  TP  TP      np  TP  TP          R shld PROM, post/inf GH JM's  TP  TK  RADHA  PROM MM  TP  TC  TC               Exercise Diary            TM amb  10'  10'  10'   10'  10'  10'  10'         DLS   70 ea  10 ea 10 ea  15ea  15 ea   15 ea          DLS kicks  54  27 ea  10 ea  10 ea  15ea  15 ea   15 ea          DLS bridges  4"K22  8"J38  3'' x 10  3" x10  3"x15  3"x15  3"x15         DLS SLR flex  H23  40  10   10  10ea  10 ea   10 ea          Clamshells  x15ea  15 ea 15 ea  15 ea  15ea  15 ea   15 ea          TB LPD  gtb 2x10  gtb x20  gtb x 20  btb x20  btb x20  btb x20 btb x20         TB rows  gtb 2x10  gtb x20  gtb x 20  gtb x25  btb x20  btb x20 btb x20         TB multifidus  otb x10ea  otb x10 ea   otb x 10 ea  otb x10 ea  otb x10ea  otb x15 ea   otb x15ea         Mini squats  10  10  10   15  15  15  15         Side stepping  2 laps  x2  x 2   x2  x2  x2  x2                                                                                                                                                                                                                                  Exercise Diary   8/27 9/4 9/6 9/13 9/17 9/20 9/24 9/27      biceps curls  3#x20  np  np  np np  np   np  np      towel circles cw/ccw  grn ball x15 ea   np  np  np  np np   np  np      RS-supine 90*flex  np  np  np np  np  np   np  np      pulleys flex  10"x3' ea (flex/scap)  np  np  np np   np  np  np                               scap punches  3#  3"x20  3# 3"x20  3#  3"x20 3# 3'' x20  3# 3" x20  3#, 3"x20   3# 3"x20 3# 3"x20       scap abcs  3# x1  3#x1 3# x1  3# x 1  3# x1   3#x1  3#x1  3#x1      S/L ER 3# x20 3#x20 3#x20  3# x20  3# x20  3#x20  3#x20  3#x20       shrugs  3#x20 np  np   np np  np   np np       TB LPD/rows  gtb x20 ea   --        -          TB IR/ER  gtb x20 IR/ER    gtb x20 IR/ER  gtb x20 IR/ER gtb x 20 IR/ER   gtb x25 IR/ER gtb x25ea  gtb x25 ea   gtb x25 ea      TB triceps ext  gtb x20 gtb x20  gtb x20   btb x 20  btb x20 btb x20   btb x20 btb x20      Wall slides  5"x15  np  np  np np   np np   np     Standing flex/scap AROM @mirror  1# x10 ea   np  np np   np np   np  np     Prone shld ext 3# x15  3#x15   3# x15 3# x 15  3#x20  3#x20   3#x20 3#x20      Prone T 15   15  15 15  15  15  15   15     Prone rows  3# x15  3#x15  3# x15 3# x15  3#x20  3#x20   3#x20  3#x20                   -                 Modalities                        MH/CP PRN                                                                              Assessment: Tolerated treatment well  Patient demonstrated fatigue post treatment and exhibited good technique with therapeutic exercises  Occasional Vc's needed for correct technique throughout session  Good tolerance with manuals  Plan: Continue per plan of care

## 2018-10-01 ENCOUNTER — OFFICE VISIT (OUTPATIENT)
Dept: PHYSICAL THERAPY | Facility: REHABILITATION | Age: 68
End: 2018-10-01
Payer: COMMERCIAL

## 2018-10-01 DIAGNOSIS — M19.011 PRIMARY OSTEOARTHRITIS OF RIGHT SHOULDER: ICD-10-CM

## 2018-10-01 DIAGNOSIS — Z96.611 AFTERCARE FOLLOWING RIGHT SHOULDER JOINT REPLACEMENT SURGERY: ICD-10-CM

## 2018-10-01 DIAGNOSIS — Z96.611 STATUS POST TOTAL REPLACEMENT OF RIGHT SHOULDER: ICD-10-CM

## 2018-10-01 DIAGNOSIS — Z47.1 AFTERCARE FOLLOWING RIGHT SHOULDER JOINT REPLACEMENT SURGERY: ICD-10-CM

## 2018-10-01 DIAGNOSIS — M54.16 LUMBAR RADICULOPATHY: Primary | ICD-10-CM

## 2018-10-01 DIAGNOSIS — Z96.611 H/O TOTAL SHOULDER REPLACEMENT, RIGHT: ICD-10-CM

## 2018-10-01 PROCEDURE — 97140 MANUAL THERAPY 1/> REGIONS: CPT

## 2018-10-01 PROCEDURE — 97110 THERAPEUTIC EXERCISES: CPT

## 2018-10-01 PROCEDURE — 97112 NEUROMUSCULAR REEDUCATION: CPT

## 2018-10-01 NOTE — PROGRESS NOTES
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    4  H/O total shoulder replacement, right Z96 611    5  Primary osteoarthritis of right shoulder M19 011                   Subjective: Pt reported pain in back but improved from last week  She reported being able to golf 18 holes yesterday to good tolerance         Objective: See treatment diary below  Manual   9/4  9/6  9/13  9/17  9/20  9/24  9/27  10/1       L LAD  TP  Tk  RADHA  MM  TP  TC  TP  TP       Figure 4 L hip PA JM  TP  TP      TP  TP  TP  TP       Prone L hip IR stretching  TP  TK  np    TP  TP  TP  TP       L2-3 gapping PRN  TP  TP      np  TP  TP  TP        R shld PROM, post/inf GH JM's  TP  TK  RADHA  PROM MM  TP  TC  TC  TC             Exercise Diary   9/4  9/6  9/13  9/17  9/20  9/24  9/27  10/1       TM amb  10'  10'  10'   10'  10'  10'  10'  10'       DLS march  52  73 ea  10 ea 10 ea  15ea  15 ea   15 ea   15 ea        DLS kicks  44  33 ea  10 ea  10 ea  15ea  15 ea   15 ea   15 ea        DLS bridges  8"M43  9"C96  3'' x 10  3" x10  3"x15  3"x15  3"x15  3"x15       DLS SLR flex  U73  59  10   10  10ea  10 ea   10 ea   10 ea       Clamshells  x15ea  15 ea 15 ea  15 ea  15ea  15 ea   15 ea   15 ea        TB LPD  gtb 2x10  gtb x20  gtb x 20  btb x20  btb x20  btb x20 btb x20  btb x20       TB rows  gtb 2x10  gtb x20  gtb x 20  gtb x25  btb x20  btb x20 btb x20  btb x20       TB multifidus  otb x10ea  otb x10 ea   otb x 10 ea  otb x10 ea  otb x10ea  otb x15 ea   otb x15ea  otb x15 ea       Mini squats  10  10  10   15  15  15  15  15       Side stepping  2 laps  x2  x 2   x2  x2  x2  x2  x2                                                                                                                                                                                                                                  Exercise Diary   8/27 9/4  9/6  9/13  9/17  9/20   9/24  9/27 10/1     biceps curls  3#x20  np  np  np np  np   np  np np     towel circles cw/ccw  grn ball x15 ea   np  np  np  np np   np  np  np    RS-supine 90*flex  np  np  np np  np  np   np  np  np    pulleys flex  10"x3' ea (flex/scap)  np  np  np np   np  np  np  np                             scap punches  3#  3"x20  3# 3"x20  3#  3"x20 3# 3'' x20  3# 3" x20  3#, 3"x20   3# 3"x20 3# 3"x20  3#   3"x20     scap abcs  3# x1  3#x1 3# x1  3# x 1  3# x1   3#x1  3#x1  3#x1 3#x1     S/L ER 3# x20 3#x20 3#x20  3# x20  3# x20  3#x20  3#x20  3#x20  3#x20     shrugs  3#x20 np  np   np np  np   np np  np     TB LPD/rows  gtb x20 ea   --        -          TB IR/ER  gtb x20 IR/ER    gtb x20 IR/ER  gtb x20 IR/ER gtb x 20 IR/ER   gtb x25 IR/ER gtb x25ea  gtb x25 ea   gtb x25 ea   gtb x25 ea  TB triceps ext  gtb x20 gtb x20  gtb x20   btb x 20  btb x20 btb x20   btb x20 btb x20  btb x20    Wall slides  5"x15  np  np  np np   np np   np  np   Standing flex/scap AROM @mirror  1# x10 ea   np  np np   np np   np  np  np   Prone shld ext 3# x15  3#x15   3# x15 3# x 15  3#x20  3#x20   3#x20 3#x20  3# x20    Prone T 15   15  15 15  15  15  15   15 15    Prone rows  3# x15  3#x15  3# x15 3# x15  3#x20  3#x20   3#x20  3#x20  3#x20                 -                 Modalities                        MH/CP PRN                                                                              Assessment: Tolerated treatment well  Patient demonstrated fatigue post treatment and exhibited good technique with therapeutic exercises  Occasional cuing needed for correct technique throughout session  Relief noted with manuals  Plan: Continue per plan of care

## 2018-10-04 ENCOUNTER — OFFICE VISIT (OUTPATIENT)
Dept: PHYSICAL THERAPY | Facility: REHABILITATION | Age: 68
End: 2018-10-04
Payer: COMMERCIAL

## 2018-10-04 DIAGNOSIS — M54.16 LUMBAR RADICULOPATHY: Primary | ICD-10-CM

## 2018-10-04 DIAGNOSIS — Z96.611 AFTERCARE FOLLOWING RIGHT SHOULDER JOINT REPLACEMENT SURGERY: ICD-10-CM

## 2018-10-04 DIAGNOSIS — Z47.1 AFTERCARE FOLLOWING RIGHT SHOULDER JOINT REPLACEMENT SURGERY: ICD-10-CM

## 2018-10-04 DIAGNOSIS — Z96.611 STATUS POST TOTAL REPLACEMENT OF RIGHT SHOULDER: ICD-10-CM

## 2018-10-04 DIAGNOSIS — Z96.611 H/O TOTAL SHOULDER REPLACEMENT, RIGHT: ICD-10-CM

## 2018-10-04 DIAGNOSIS — M19.011 PRIMARY OSTEOARTHRITIS OF RIGHT SHOULDER: ICD-10-CM

## 2018-10-04 PROCEDURE — 97140 MANUAL THERAPY 1/> REGIONS: CPT

## 2018-10-04 PROCEDURE — 97112 NEUROMUSCULAR REEDUCATION: CPT

## 2018-10-04 NOTE — PROGRESS NOTES
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    4  H/O total shoulder replacement, right Z96 611    5  Primary osteoarthritis of right shoulder M19 011                   Subjective: Pt reported fell in her bathroom and fractured her L clavicle  She is to have a sling for 4 weeks  MD released her back to PT for other diagnoses  She noted she has been performing HEP well with no issues        Objective: See treatment diary below  Manual  10/4             L LAD  TC             Figure 4 L hip PA JM  np             Prone L hip IR stretching  np             L2-3 gapping PRN  np              R shld PROM, post/inf GH JM's  TC                   Exercise Diary   10/4              TM amb  10'              DLS  ea              DLS kicks  62 ea              DLS bridges  3"G02              DLS SLR flex  Z43 ea               Clamshells np              TB LPD  btb 2x10              TB rows  btb 2x10              TB multifidus  gtb M24HT              Mini squats  15              Side stepping  2 laps                                                                                                                                                                                                                                         Exercise Diary   10/4             biceps curls  np            towel circles cw/ccw  np            RS-supine 90*flex  np            pulleys flex  np                                     scap punches  3#  3"x20             scap abcs  3# x1            S/L ER np            shrugs  np            TB LPD/rows  ----               TB IR/ER  gtb x20 IR/ER              TB triceps ext  btb x20 Wall slides  np           Standing flex/scap AROM @mirror  np           Prone shld ext 3# x15   std           Prone T 15   std           Prone rows  3# x15  std                         -                 Modalities                        MH/CP PRN                                                                              Assessment: Tolerated treatment well  Patient demonstrated fatigue post treatment and exhibited good technique with therapeutic exercises  Good tolerance with modified session  No reports of increased pain noted  Plan: Continue per plan of care

## 2018-10-09 ENCOUNTER — OFFICE VISIT (OUTPATIENT)
Dept: PHYSICAL THERAPY | Facility: REHABILITATION | Age: 68
End: 2018-10-09
Payer: COMMERCIAL

## 2018-10-09 DIAGNOSIS — M54.16 LUMBAR RADICULOPATHY: Primary | ICD-10-CM

## 2018-10-09 DIAGNOSIS — Z47.1 AFTERCARE FOLLOWING RIGHT SHOULDER JOINT REPLACEMENT SURGERY: ICD-10-CM

## 2018-10-09 DIAGNOSIS — Z96.611 AFTERCARE FOLLOWING RIGHT SHOULDER JOINT REPLACEMENT SURGERY: ICD-10-CM

## 2018-10-09 PROCEDURE — 97110 THERAPEUTIC EXERCISES: CPT | Performed by: PHYSICAL THERAPIST

## 2018-10-09 PROCEDURE — 97140 MANUAL THERAPY 1/> REGIONS: CPT | Performed by: PHYSICAL THERAPIST

## 2018-10-09 PROCEDURE — 97112 NEUROMUSCULAR REEDUCATION: CPT | Performed by: PHYSICAL THERAPIST

## 2018-10-09 NOTE — PROGRESS NOTES
Daily Note     Today's date: 10/9/2018  Patient name: Tony Solano  : 1950  MRN: [id number]  Referring provider: Nadira Cope MD  Dx:   Encounter Diagnosis     ICD-10-CM    1  Lumbar radiculopathy M54 16    2  Aftercare following right shoulder joint replacement surgery Z47 1     Z96 611         1on1 entire session  Subjective: Pt reports her R shoulder is getting sore from overuse now that she sustained L clavicle fracture  Pt notes improvement with her lumbar spine, reporting that she was able to walk on a college tour with her granddaughter for a good hour over the weekend  Objective: See treatment diary below  Manual  10/4  10/9                   L LAD  TC  TP                   Figure 4 L hip PA JM  np  np                   Prone L hip IR stretching  np  supine TP                   L2-3 gapping PRN  np  np                    R shld PROM, post/inf GH JM's  TC  TP                         Exercise Diary   10/4  10/9                   TM amb  10'  10'                   DLS march  36 ea  59AF                   DLS kicks  50 ea  84MG                   DLS bridges  6"G89  1"V97                   DLS SLR flex  B38 ea   x10ea                   Clamshells np  np                   TB LPD  btb 2x10  btb 2x10                   TB rows  btb 2x10  btb 2x10                   TB multifidus  gtb x15ea  np                   Mini squats  15  15                   Side stepping  2 laps  2 laps                    Hip adduction squeeze    5"x20                    hooklying TB hip ABD    otb 3"x20                                                                                                                                                                                              Exercise Diary   10/4 10/9                   biceps curls  np np                   towel circles cw/ccw  np np                   RS-supine 90*flex  np  np                  pulleys flex  np  np                                           scap punches  3#  3"x20  3#, 3"x20                  scap abcs  3# x1 3#x1                   S/L ER np np                   shrugs  np  np                  TB LPD/rows  ----  ---                  TB IR/ER  gtb x20 IR/ER     gtb x20ea                 TB triceps ext  btb x20  btb x20                 Wall slides  np np                  Standing flex/scap AROM @mirror  np np                  Prone shld ext 3# x15   std 3#x15 std                  Prone T 15   std 15 std                  Prone rows  3# x15  std 3#x15 std                                -                 Modalities                        MH/CP PRN                                                     Assessment: Tolerated treatment well  Patient exhibited good technique with therapeutic exercises  No c/o pain with TE performance  Treatment has been modified due to L clavicle fracture  Plan: Continue per plan of care

## 2018-10-12 ENCOUNTER — APPOINTMENT (OUTPATIENT)
Dept: PHYSICAL THERAPY | Facility: REHABILITATION | Age: 68
End: 2018-10-12
Payer: COMMERCIAL

## 2018-10-12 ENCOUNTER — EVALUATION (OUTPATIENT)
Dept: PHYSICAL THERAPY | Facility: REHABILITATION | Age: 68
End: 2018-10-12
Payer: COMMERCIAL

## 2018-10-12 DIAGNOSIS — Z47.1 AFTERCARE FOLLOWING RIGHT SHOULDER JOINT REPLACEMENT SURGERY: ICD-10-CM

## 2018-10-12 DIAGNOSIS — Z96.611 AFTERCARE FOLLOWING RIGHT SHOULDER JOINT REPLACEMENT SURGERY: ICD-10-CM

## 2018-10-12 DIAGNOSIS — M54.16 LUMBAR RADICULOPATHY: Primary | ICD-10-CM

## 2018-10-12 DIAGNOSIS — Z96.611 STATUS POST TOTAL REPLACEMENT OF RIGHT SHOULDER: ICD-10-CM

## 2018-10-12 PROCEDURE — 97110 THERAPEUTIC EXERCISES: CPT | Performed by: PHYSICAL THERAPIST

## 2018-10-12 PROCEDURE — 97140 MANUAL THERAPY 1/> REGIONS: CPT | Performed by: PHYSICAL THERAPIST

## 2018-10-12 PROCEDURE — G8991 OTHER PT/OT GOAL STATUS: HCPCS | Performed by: PHYSICAL THERAPIST

## 2018-10-12 PROCEDURE — G8990 OTHER PT/OT CURRENT STATUS: HCPCS | Performed by: PHYSICAL THERAPIST

## 2018-10-12 NOTE — PROGRESS NOTES
PT Re-Evaluation     Today's date: 10/12/2018  Patient name: Emil Sanders  : 1950  MRN: [id number]  Referring provider: Nicky Foy MD  Dx:   Encounter Diagnosis     ICD-10-CM    1  Lumbar radiculopathy M54 16    2  Aftercare following right shoulder joint replacement surgery Z47 1     Z96 611                   Assessment  Impairments: abnormal gait, abnormal or restricted ROM, activity intolerance, impaired physical strength and pain with function    Assessment details: Pt has progressed well, demonstrating improvement in shoulder ROM, strength, and function with a decrease in pain  Pt has met majority of PT goals regarding her shoulder and her shoulder TE's will be d/c to ongoing HEP at this time  Pt has also demonstrated improvement in lumbar ROM, strength, and function/activity tolerance with a decrease in pain since starting therapy  Pt would benefit from continued skilled PT intervention to address these issues and to maximize function    Understanding of Dx/Px/POC: good   Prognosis: good    Goals  Short Term:  Pt will report decreased levels of pain by at least 2 subjective ratings in 4 weeks-met  Pt will demonstrate improved ROM by at least 25% in 4 weeks-met  Pt will demonstrate improved strength by 1/2 grade MMT in 4 weeks-met  Long Term:   Pt will be independent in their HEP in 8 weeks-partially met  Pt will demonstrate improved FOTO, > 65-partially met  Pt will be independent with all ADL's-partially met  Pt will be able to return to golf with minimal pain-met    Plan  Patient would benefit from: skilled physical therapy  Planned therapy interventions: abdominal trunk stabilization, joint mobilization, manual therapy, neuromuscular re-education, body mechanics training, patient education, postural training, home exercise program, therapeutic activities, flexibility and therapeutic exercise  Frequency: 2x week  Duration in weeks: 4  Treatment plan discussed with: patient        Subjective Evaluation    History of Present Illness  Mechanism of injury: Pt reports 90% improvement in her shoulder and 65% improvement in her lumbar spine region since starting therapy  Pt reports no significant functional limitations in regards to there shoulder at this time, but is having some soreness from using her R UE more now that she recently fractured her L clavicle  Pt reports pain in lumbar spine, L hip/groin and L knee intermittently with prolonged ambulation>60 mins, sit to stand transfers, prolonged standing>45 mins  Pt notes improvement with steps (able to perform reciprocally at slow pace), bendnig down, prolonged sitting, golfing (prior to clavicle fracture)  Pt did not attempt any swimming  Pain  Location: R shoulder 2/10 at worse, L/S 3/10 at worse, L groin/knee 4/10 at worse    Treatments  Current treatment: physical therapy  Patient Goals  Patient goals for therapy: decreased pain, increased motion, increased strength, independence with ADLs/IADLs and return to sport/leisure activities          Objective     Active Range of Motion     Right Shoulder   Flexion: 130 degrees   Abduction: 100 degrees   External rotation 0°: 85 degrees     Additional Active Range of Motion Details  Flex=wfl, Ext=50% with pulling L groin, RSB=wfl, LSB=75%, B rot=wfl        Passive Range of Motion     Right Shoulder   Flexion: 140 degrees   Abduction: 155 degrees   External rotation 90°: 80 degrees   Internal rotation 90°: 45 degrees     Strength/Myotome Testing     Right Shoulder     Planes of Motion   Flexion: 4-   External rotation at 0°: 5   Internal rotation at 0°: 4+     Left Hip   Planes of Motion   Flexion: 3+ (pain)  Abduction: 4 (measured in hooklying)    Right Hip   Planes of Motion   Flexion: 4+  Abduction: 4 (measured in hooklying)    Left Knee   Flexion: 4+  Extension: 4+    Right Knee   Flexion: 5  Extension: 5          Precautions: Oa, HTN, history of thyroid cancer and thyroid surgery, HTN, chronic back pain, R TSA    Daily Treatment Diary   Manual  10/4  10/9  10/12                 L LAD  TC  TP  TP                 Figure 4 L hip PA JM  np  np  np                 Prone L hip IR stretching  np  supine TP  np                 L2-3 gapping PRN  np  np  np                 L hip lateral distraction and AP hip JM's   TP           R shld PROM, post/inf GH JM's  TC  TP  TP                       Exercise Diary   10/4  10/9  10/12                 nTM amb  10'  10'  10'                 DLS march 92 ea  22NY  05IJ                 DLS kicks  39 ea  17BD [place]                 DLS bridges  4"B43  6"M63  3"x15                 DLS SLR flex  F17 ea   x10ea  x10ea                 Clamshells np  np  np                 TB LPD  btb 2x10  btb 2x10 btb 2x10                 TB rows  btb 2x10  btb 2x10  btb 2x10                 TB multifidus  gtb x15ea  np  np                 Mini squats  15  15  15                 Side stepping  2 laps  2 laps  3 laps                  Hip adduction squeeze    5"x20 5"x20                  hooklying TB hip ABD    otb 3"x20  otb 3"x20                  TA w/bent knee fall out      5"x10ea                                                                                                                                                                    Exercise Diary   10/4 10/9                   biceps curls  np np                   towel circles cw/ccw  np np                   RS-supine 90*flex  np  np                  pulleys flex  np  np                                           scap punches  3#  3"x20  3#, 3"x20                  scap abcs  3# x1 3#x1                   S/L ER np np                   shrugs  np  np                  TB LPD/rows  ----  ---                  TB IR/ER  gtb x20 IR/ER     gtb x20ea                 TB triceps ext  btb x20  btb x20                 Wall slides  np np                  Standing flex/scap AROM @mirror  np np                  Prone shld ext 3# x15   std 3#x15 std                  Prone T 15   std 15 std                  Prone rows  3# x15  std 3#x15 std                                -                 Modalities                        MH/CP PRN                                                 Updated measurements and functional status taken this session  1on1 [id number] and performed remaining TE's as part of IEP

## 2018-10-12 NOTE — LETTER
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          PT Re-Evaluation     Today's date: 10/12/2018  Patient name: Enmanuel Gallo  : 1950  MRN: [id number]  Referring provider: Dinah Lozano MD  Dx:   Encounter Diagnosis     ICD-10-CM    1  Lumbar radiculopathy M54 16    2  Aftercare following right shoulder joint replacement surgery Z47 1     Z96 611                   Assessment  Impairments: abnormal gait, abnormal or restricted ROM, activity intolerance, impaired physical strength and pain with function    Assessment details: Pt has progressed well, demonstrating improvement in shoulder ROM, strength, and function with a decrease in pain  Pt has met majority of PT goals regarding her shoulder and her shoulder TE's will be d/c to ongoing HEP at this time  Pt has also demonstrated improvement in lumbar ROM, strength, and function/activity tolerance with a decrease in pain since starting therapy    Pt would benefit from continued skilled PT intervention to address these issues and to maximize function  Understanding of Dx/Px/POC: good   Prognosis: good    Goals  Short Term:  Pt will report decreased levels of pain by at least 2 subjective ratings in 4 weeks-met  Pt will demonstrate improved ROM by at least 25% in 4 weeks-met  Pt will demonstrate improved strength by 1/2 grade MMT in 4 weeks-met  Long Term:   Pt will be independent in their HEP in 8 weeks-partially met  Pt will demonstrate improved FOTO, > 65-partially met  Pt will be independent with all ADL's-partially met  Pt will be able to return to golf with minimal pain-met    Plan  Patient would benefit from: skilled physical therapy  Planned therapy interventions: abdominal trunk stabilization, joint mobilization, manual therapy, neuromuscular re-education, body mechanics training, patient education, postural training, home exercise program, therapeutic activities, flexibility and therapeutic exercise  Frequency: 2x week  Duration in weeks: 4  Treatment plan discussed with: patient        Subjective Evaluation    History of Present Illness  Mechanism of injury: Pt reports 90% improvement in her shoulder and 65% improvement in her lumbar spine region since starting therapy  Pt reports no significant functional limitations in regards to there shoulder at this time, but is having some soreness from using her R UE more now that she recently fractured her L clavicle  Pt reports pain in lumbar spine, L hip/groin and L knee intermittently with prolonged ambulation>60 mins, sit to stand transfers, prolonged standing>45 mins  Pt notes improvement with steps (able to perform reciprocally at slow pace), bendnig down, prolonged sitting, golfing (prior to clavicle fracture)  Pt did not attempt any swimming    Pain  Location: R shoulder 2/10 at worse, L/S 3/10 at worse, L groin/knee 4/10 at worse    Treatments  Current treatment: physical therapy  Patient Goals  Patient goals for therapy: decreased pain, increased motion, increased strength, independence with ADLs/IADLs and return to sport/leisure activities          Objective     Active Range of Motion     Right Shoulder   Flexion: 130 degrees   Abduction: 100 degrees   External rotation 0°:  85 degrees     Additional Active Range of Motion Details  Flex=wfl, Ext=50% with pulling L groin, RSB=wfl, LSB=75%, B rot=wfl  Passive Range of Motion     Right Shoulder   Flexion: 140 degrees   Abduction: 155 degrees   External rotation 90°:  80 degrees   Internal rotation 90°:  45 degrees     Strength/Myotome Testing     Right Shoulder     Planes of Motion   Flexion: 4-   External rotation at 0°:  5   Internal rotation at 0°:  4+     Left Hip   Planes of Motion   Flexion: 3+ (pain)  Abduction: 4 (measured in hooklying)    Right Hip   Planes of Motion   Flexion: 4+  Abduction: 4 (measured in hooklying)    Left Knee   Flexion: 4+  Extension: 4+    Right Knee   Flexion: 5  Extension: 5          Precautions: Oa, HTN, history of thyroid cancer and thyroid surgery, HTN, chronic back pain, R TSA    Daily Treatment Diary   Manual  10/4  10/9  10/12                 L LAD  TC  TP  TP                 Figure 4 L hip PA JM  np  np  np                 Prone L hip IR stretching  np  supine TP  np                 L2-3 gapping PRN  np  np  np                 L hip lateral distraction and AP hip JM's   TP           R shld PROM, post/inf GH JM's  TC  TP  TP                       Exercise Diary   10/4  10/9  10/12                 nTM amb  10'  10'  10'                 DLS march 20 ea  74VV  98GV                 DLS kicks  99 ea  47NH [place]                 DLS bridges  3"F85  8"R89  3"x15                 DLS SLR flex  Z93 ea   x10ea  x10ea                 Clamshells np  np  np                 TB LPD  btb 2x10  btb 2x10 btb 2x10                 TB rows  btb 2x10  btb 2x10  btb 2x10                 TB multifidus  gtb x15ea  np  np                 Mini squats  15  15  15                 Side stepping  2 laps  2 laps  3 laps                  Hip adduction squeeze    5"x20 5"x20                  hooklying TB hip ABD    otb 3"x20  otb 3"x20                  TA w/bent knee fall out      5"x10ea                                                                                                                                                                    Exercise Diary   10/4 10/9                   biceps curls  np np                   towel circles cw/ccw  np np                   RS-supine 90*flex  np  np                  pulleys flex  np  np                                           scap punches  3#  3"x20  3#, 3"x20                  scap abcs  3# x1 3#x1                   S/L ER np np                   shrugs  np  np                  TB LPD/rows  ----  ---                  TB IR/ER  gtb x20 IR/ER     gtb x20ea                 TB triceps ext  btb x20  btb x20                 Wall slides  np np                  Standing flex/scap AROM @mirror  np np                  Prone shld ext 3# x15   std 3#x15 std                  Prone T 15   std 15 std                  Prone rows  3# x15  std 3#x15 std                                -                 Modalities                        MH/CP PRN                                                 Updated measurements and functional status taken this session  1on1 [id number] and performed remaining TE's as part of IEP

## 2018-10-15 ENCOUNTER — OFFICE VISIT (OUTPATIENT)
Dept: PHYSICAL THERAPY | Facility: REHABILITATION | Age: 68
End: 2018-10-15
Payer: COMMERCIAL

## 2018-10-15 DIAGNOSIS — M54.16 LUMBAR RADICULOPATHY: Primary | ICD-10-CM

## 2018-10-15 PROCEDURE — 97110 THERAPEUTIC EXERCISES: CPT

## 2018-10-15 PROCEDURE — 97140 MANUAL THERAPY 1/> REGIONS: CPT

## 2018-10-15 NOTE — PROGRESS NOTES
Daily Note     Today's date: 10/15/2018  Patient name: Layne Fisher  : 1950  MRN: [id number]  Referring provider: Winston Glaser MD  Dx:   Encounter Diagnosis     ICD-10-CM    1  Lumbar radiculopathy M54 16        Start Time: 7645  Stop Time: 1240  Total time in clinic (min): 55 minutes    Subjective: Patient reports a burning pain from her groin to her knee (left)  Patient notes that she has relief     Objective: See treatment diary below    Assessment: Tolerated treatment well  Patient demonstrated fatigue post treatment, exhibited good technique with therapeutic exercises and would benefit from continued PT  Plan: Continue per plan of care  Precautions: Oa, HTN, history of thyroid cancer and thyroid surgery, HTN, chronic back pain, R TSA    Daily Treatment Diary   Manual  10/4  10/9  10/12  10/15               L LAD  TC  TP  TP  TP               Figure 4 L hip PA JM  np  np  np  np               Prone L hip IR stretching  np  supine TP  np  np               L2-3 gapping PRN  np  np  np  np               L hip lateral distraction and AP hip JM's   TP TP          R shld PROM, post/inf GH JM's  TC  TP  TP  np                   Exercise Diary   10/4  10/9  10/12  10/15               nTM amb  10'  10'  10'  10'               DLS  ea  01RX  19QL  33CR               DLS kicks  64 ea  55YX Mily Yvon  15ea               DLS bridges  9"P01  6"E58  3"x15  3"x15               DLS SLR flex  P29 ea   x10ea  x10ea  x10ea               Clamshells np  np  np  np               TB LPD  btb 2x10  btb 2x10 btb 2x10  btb 2x10               TB rows  btb 2x10  btb 2x10  btb 2x10  btb 2x10               TB multifidus  gtb x15ea  np  np  np               Mini squats  15  15  15 15               Side stepping  2 laps  2 laps  3 laps  3 laps                Hip adduction squeeze    5"x20 5"x20  5"x20                hooklying TB hip ABD    otb 3"x20  otb 3"x20  otb   3"x20                TA w/bent knee fall out      5"x10ea  5"x10ea                                                                                                                                                                  Modalities                        MH/CP PRN

## 2018-10-17 ENCOUNTER — OFFICE VISIT (OUTPATIENT)
Dept: PHYSICAL THERAPY | Facility: REHABILITATION | Age: 68
End: 2018-10-17
Payer: COMMERCIAL

## 2018-10-17 DIAGNOSIS — M54.16 LUMBAR RADICULOPATHY: Primary | ICD-10-CM

## 2018-10-17 PROCEDURE — 97110 THERAPEUTIC EXERCISES: CPT | Performed by: PHYSICAL THERAPIST

## 2018-10-17 PROCEDURE — 97112 NEUROMUSCULAR REEDUCATION: CPT | Performed by: PHYSICAL THERAPIST

## 2018-10-17 PROCEDURE — 97140 MANUAL THERAPY 1/> REGIONS: CPT | Performed by: PHYSICAL THERAPIST

## 2018-10-17 NOTE — PROGRESS NOTES
Daily Note     Today's date: 10/17/2018  Patient name: Rob Kohler  : 1950  MRN: [id number]  Referring provider: Michelle Nunez MD  Dx:   Encounter Diagnosis     ICD-10-CM    1  Lumbar radiculopathy M54 16            1on1 [id number]  Subjective: Pt reports increased pain in hip/groin yesterday  Pt advised to schedule f/u with referring MD due to persistent symptoms  Objective: See treatment diary below  Manual  10/4  10/9  10/12  10/15  10/17             L LAD  TC  TP  TP  TP  TP             Figure 4 L hip PA JM  np  np  np  np  np             Prone L hip IR stretching  np  supine TP  np  np  np             L2-3 gapping PRN  np  np  np  np  np             L hip lateral distraction and AP hip JM's     TP TP  TP              R shld PROM, post/inf GH JM's  TC  TP  TP  np dc                 Exercise Diary   10/4  10/9  10/12  10/15  10/17             TM amb  10'  10'  10'  10'  10'             DLS  ea  03IS  94DV  52YC  39IV             DLS kicks  87 ea  29VP [place]  15ea  15ea             DLS bridges  5"J51  9"K97  3"x15  3"x15  3"x15ea             DLS SLR flex  H75 ea   x10ea  x10ea  x10ea [place]             Clamshells np  np  np  np  np             TB LPD  btb 2x10  btb 2x10 btb 2x10  btb 2x10  btb 2x10             TB rows  btb 2x10  btb 2x10  btb 2x10  btb 2x10  btb 2x10             TB multifidus  gtb x15ea  np  np  np  np             Mini squats  15  15  15 15  15             Side stepping  2 laps  2 laps  3 laps  3 laps  3 laps              Hip adduction squeeze    5"x20 5"x20  5"x20  5"x20              hooklying TB hip ABD    otb 3"x20  otb 3"x20  otb   3"x20  otb 3"x20              TA w/bent knee fall out      5"x10ea  5"x10ea  5"x10ea              Standing hip ABD b/l          x15ea                                                                                                                                        Modalities                        MH/CP PRN                                                     Assessment: Tolerated treatment well  Some relief with manuals this session  Pt notes mild low back pain post session  Patient would benefit from continued PT      Plan: Continue per plan of care

## 2018-10-22 ENCOUNTER — OFFICE VISIT (OUTPATIENT)
Dept: PHYSICAL THERAPY | Facility: REHABILITATION | Age: 68
End: 2018-10-22
Payer: COMMERCIAL

## 2018-10-22 DIAGNOSIS — M54.16 LUMBAR RADICULOPATHY: Primary | ICD-10-CM

## 2018-10-22 PROCEDURE — 97110 THERAPEUTIC EXERCISES: CPT | Performed by: PHYSICAL THERAPIST

## 2018-10-22 PROCEDURE — 97112 NEUROMUSCULAR REEDUCATION: CPT | Performed by: PHYSICAL THERAPIST

## 2018-10-22 NOTE — PROGRESS NOTES
Daily Note     Today's date: 10/22/2018  Patient name: Paul Zhao  : 1950  MRN: [id number]  Referring provider: Saji Whiting MD  Dx:   Encounter Diagnosis     ICD-10-CM    1  Lumbar radiculopathy M54 16            1on1 entire session  Subjective: Pt reports pain with prolonged standing and prolonged ambulation  Pt currently has some soreness in lumbar spine, L groin and L knee  Pt has a f/u appt with MD this Thursday  Objective: See treatment diary below  Manual  10/4  10/9  10/12  10/15  10/17  10/22           L LAD  TC  TP  TP  TP  TP  TP           Figure 4 L hip PA JM  np  np  np  np  np  np           Prone L hip IR stretching  np  supine TP  np  np  np  np           L2-3 gapping PRN  np  np  np  np  np  np           L hip lateral distraction and AP hip JM's     TP TP  TP  TP also added hip post JM's             R shld PROM, post/inf GH JM's  TC  TP  TP  np dc  dc               Exercise Diary   10/4  10/9  10/12  10/15  10/17  10/22           TM amb  10'  10'  10'  10'  10'  10'           DLS  ea  87BF  79SG  01FD  12HL  54MB           DLS kicks  47 ea  04FF [place]  20ea           DLS bridges  7"O05  1"K03 [place]  3"x15 [place]  3"x15           DLS SLR flex  A04 ea   x10ea  x10ea  x10ea  16TN  35AO           Clamshells np  np  np  np  np  np           TB LPD  btb 2x10  btb 2x10 btb 2x10  btb 2x10  btb 2x10  btb 2x10           TB rows  btb 2x10  btb 2x10  btb 2x10  btb 2x10  btb 2x10  btb 2x10           TB multifidus  gtb x15ea  np  np  np  np  np           Mini squats  15  15  15 15  15  15           Side stepping  2 laps  2 laps  3 laps  3 laps  3 laps  3 laps            Hip adduction squeeze    5"x20 5"x20  5"x20  5"x20  5"x20            hooklying TB hip ABD    otb 3"x20  otb 3"x20  otb   3"x20  otb 3"x20  otb  3"x20            TA w/bent knee fall out      5"x10ea  5"x10ea  5"x10ea  5"x10ea            Standing hip ABD b/l          M26RM  D64LX                                                                                                                                      Modalities                        MH/CP PRN                             Assessment: Tolerated treatment well  Patient experiences reilef with LAD  Intermittent cueing for correct technique with TE performance  Plan: Continue per plan of care  Pt to f/u with MD on 10/25/18

## 2018-10-25 ENCOUNTER — APPOINTMENT (OUTPATIENT)
Dept: PHYSICAL THERAPY | Facility: REHABILITATION | Age: 68
End: 2018-10-25
Payer: COMMERCIAL

## 2018-10-29 ENCOUNTER — OFFICE VISIT (OUTPATIENT)
Dept: PHYSICAL THERAPY | Facility: REHABILITATION | Age: 68
End: 2018-10-29
Payer: COMMERCIAL

## 2018-10-29 DIAGNOSIS — Z47.1 AFTERCARE FOLLOWING RIGHT SHOULDER JOINT REPLACEMENT SURGERY: ICD-10-CM

## 2018-10-29 DIAGNOSIS — M19.011 PRIMARY OSTEOARTHRITIS OF RIGHT SHOULDER: ICD-10-CM

## 2018-10-29 DIAGNOSIS — M54.16 LUMBAR RADICULOPATHY: Primary | ICD-10-CM

## 2018-10-29 DIAGNOSIS — Z96.611 STATUS POST TOTAL REPLACEMENT OF RIGHT SHOULDER: ICD-10-CM

## 2018-10-29 DIAGNOSIS — Z96.611 H/O TOTAL SHOULDER REPLACEMENT, RIGHT: ICD-10-CM

## 2018-10-29 DIAGNOSIS — Z96.611 AFTERCARE FOLLOWING RIGHT SHOULDER JOINT REPLACEMENT SURGERY: ICD-10-CM

## 2018-10-29 PROCEDURE — 97112 NEUROMUSCULAR REEDUCATION: CPT

## 2018-10-29 NOTE — PROGRESS NOTES
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    4  H/O total shoulder replacement, right Z96 611    5  Primary osteoarthritis of right shoulder M19 011                   Subjective: Pt reported MD said pain is coming from hip and she will need a hip replacement  She is seeing an Ortho MD 11-8  Objective: See treatment diary below  Manual  10/4  10/9  10/12  10/15  10/17  10/22  10/29         L LAD  TC  TP  TP  TP  TP  TP  TC         Figure 4 L hip PA JM  np  np  np  np  np  np  np         Prone L hip IR stretching  np  supine TP  np  np  np  np  np         L2-3 gapping PRN  np  np  np  np  np  np  np         L hip lateral distraction and AP hip JM's     TP TP  TP  TP also added hip post JM's   np          R shld PROM, post/inf GH JM's  TC  TP  TP  np dc  dc  dc             Exercise Diary   10/4  10/9  10/12  10/15  10/17  10/22  10/29         TM amb  10'  10'  10'  10'  10'  10'  10'         DLS  ea  47SG  15ea  18OP  97BZ  20ea  20 ea          DLS kicks  30 ea  92XW  13LU  13VI [place]  20ea  20 ea          DLS bridges  2"X31  5"V32 Bas Nathan Bas Nathan [place] Bas Nathan Bas Nathan         DLS SLR flex  W26 ea   x10ea  x10ea  x10ea  10ea [place]  15 ea          Clamshells np  np  np  np  np  np  np         TB LPD  btb 2x10  btb 2x10 btb 2x10  btb 2x10  btb 2x10  btb 2x10  btb x20         TB rows  btb 2x10  btb 2x10  btb 2x10  btb 2x10  btb 2x10  btb 2x10  btb 20         TB multifidus  gtb x15ea  np  np  np  np  np  np         Mini squats  15  15  15 15  15  15  15         Side stepping  2 laps  2 laps  3 laps  3 laps  3 laps  3 laps  x3          Hip adduction squeeze    5"x20 5"x20  5"x20  5"x20  5"x20  5"x20          hooklying TB hip ABD    otb 3"x20  otb 3"x20  otb   3"x20  otb 3"x20  otb  3"x20  otb 3"x20          TA w/bent knee fall out      5"x10ea  5"x10ea  5"x10ea  5"x10ea  5"x10  Ea           Standing hip ABD b/l          x15ea  S19UQ  K31 ea                                                                                                                                     Modalities                        MH/CP PRN                              Assessment: Tolerated treatment well  Patient demonstrated fatigue post treatment and exhibited good technique with therapeutic exercises  VC's needed for correct technique throughout session  Pt Dc to HEP  Seeing Ortho next week and she is to contact our office with results on possible surgery  Plan: Continue per plan of care

## 2019-07-25 ENCOUNTER — APPOINTMENT (OUTPATIENT)
Dept: RADIOLOGY | Facility: OTHER | Age: 69
End: 2019-07-25
Payer: COMMERCIAL

## 2019-07-25 ENCOUNTER — OFFICE VISIT (OUTPATIENT)
Dept: OBGYN CLINIC | Facility: OTHER | Age: 69
End: 2019-07-25
Payer: COMMERCIAL

## 2019-07-25 VITALS
SYSTOLIC BLOOD PRESSURE: 102 MMHG | DIASTOLIC BLOOD PRESSURE: 67 MMHG | WEIGHT: 153.4 LBS | BODY MASS INDEX: 23.25 KG/M2 | HEIGHT: 68 IN | HEART RATE: 56 BPM

## 2019-07-25 DIAGNOSIS — M25.512 LEFT SHOULDER PAIN, UNSPECIFIED CHRONICITY: ICD-10-CM

## 2019-07-25 DIAGNOSIS — M19.012 OSTEOARTHRITIS OF LEFT GLENOHUMERAL JOINT: Primary | ICD-10-CM

## 2019-07-25 PROCEDURE — 99213 OFFICE O/P EST LOW 20 MIN: CPT | Performed by: ORTHOPAEDIC SURGERY

## 2019-07-25 PROCEDURE — 73030 X-RAY EXAM OF SHOULDER: CPT

## 2019-07-25 NOTE — PROGRESS NOTES
Assessment  Left shoulder glenohumeral joint DJD    Discussion and Plan:    Patient has glenohumeral osteoarthritis her left shoulder and much like her right shoulder she understands treatment options are and at symptomatic care  We did offer her a corticosteroid injection today which she politely declined but may wish to have that in the future  She will continue with over-the-counter anti-inflammatories and activity modification follow-up in the future for symptomatic care if necessary  Subjective:   Patient ID: Jose C Hill is a 78 y o  female    48-year-old female reports to the office today for evaluation of her left shoulder  She notes ongoing left shoulder pain for number of years  She denies any specific injury or trauma  She notes generalized shoulder pain worse with activities  She notes stiffness  She denies weakness or instability  No numbness or tingling  No fevers or chills  She does take Aleve with mild relief  The following portions of the patient's history were reviewed and updated as appropriate: allergies, current medications, past family history, past medical history, past social history, past surgical history and problem list     Review of Systems   Constitutional: Negative for chills and fever  HENT: Negative for ear pain, sinus pain and sore throat  Eyes: Negative for pain  Respiratory: Negative for shortness of breath  Cardiovascular: Negative for chest pain  Gastrointestinal: Negative for constipation, diarrhea and nausea  Musculoskeletal: Positive for arthralgias, myalgias and neck pain  Neurological: Negative for weakness and numbness  Objective:    Left shoulder:  No gross deformity  Skin intact  No erythema ecchymosis or swelling  No tenderness to palpation  Passive of motion includes 160° of forward flexion, 90° of external rotation-abduction, 30° of internal rotation-abduction  Crepitation noted with passive range of motion    Negative Alatorre impingement  Negative empty can test   Negative speed's test   Negative cross-body adduction test   Sensation intact axillary, median, ulnar and radial nerves  2+ radial pulse  Physical Exam   Constitutional: She is oriented to person, place, and time  She appears well-developed and well-nourished  HENT:   Head: Normocephalic and atraumatic  Eyes: Conjunctivae are normal    Neck: Neck supple  Cardiovascular: Intact distal pulses  Pulmonary/Chest: Breath sounds normal    Neurological: She is alert and oriented to person, place, and time  Skin: Skin is warm and dry  Psychiatric: She has a normal mood and affect  Her behavior is normal  Judgment and thought content normal          I have personally reviewed pertinent films in PACS and my interpretation is as follows  X-rays performed today of the left shoulder reveals significant glenohumeral joint DJD  No acute osseous abnormalities noted  Deepthi Durand MD personally examined the patient and reviewed the history provided    I agree with the note and the assessment and plan by Pam Anand PA-C

## 2020-02-20 ENCOUNTER — OFFICE VISIT (OUTPATIENT)
Dept: OBGYN CLINIC | Facility: OTHER | Age: 70
End: 2020-02-20
Payer: COMMERCIAL

## 2020-02-20 VITALS
HEART RATE: 54 BPM | HEIGHT: 68 IN | BODY MASS INDEX: 24.55 KG/M2 | SYSTOLIC BLOOD PRESSURE: 128 MMHG | WEIGHT: 162 LBS | DIASTOLIC BLOOD PRESSURE: 72 MMHG

## 2020-02-20 DIAGNOSIS — M19.012 OSTEOARTHRITIS OF LEFT GLENOHUMERAL JOINT: Primary | ICD-10-CM

## 2020-02-20 PROCEDURE — 20610 DRAIN/INJ JOINT/BURSA W/O US: CPT | Performed by: ORTHOPAEDIC SURGERY

## 2020-02-20 PROCEDURE — 99214 OFFICE O/P EST MOD 30 MIN: CPT | Performed by: ORTHOPAEDIC SURGERY

## 2020-02-20 RX ORDER — BETAMETHASONE SODIUM PHOSPHATE AND BETAMETHASONE ACETATE 3; 3 MG/ML; MG/ML
6 INJECTION, SUSPENSION INTRA-ARTICULAR; INTRALESIONAL; INTRAMUSCULAR; SOFT TISSUE
Status: COMPLETED | OUTPATIENT
Start: 2020-02-20 | End: 2020-02-20

## 2020-02-20 RX ORDER — BUPIVACAINE HYDROCHLORIDE 2.5 MG/ML
2 INJECTION, SOLUTION INFILTRATION; PERINEURAL
Status: COMPLETED | OUTPATIENT
Start: 2020-02-20 | End: 2020-02-20

## 2020-02-20 RX ADMIN — BUPIVACAINE HYDROCHLORIDE 2 ML: 2.5 INJECTION, SOLUTION INFILTRATION; PERINEURAL at 14:26

## 2020-02-20 RX ADMIN — BETAMETHASONE SODIUM PHOSPHATE AND BETAMETHASONE ACETATE 6 MG: 3; 3 INJECTION, SUSPENSION INTRA-ARTICULAR; INTRALESIONAL; INTRAMUSCULAR; SOFT TISSUE at 14:26

## 2020-02-20 NOTE — PATIENT INSTRUCTIONS

## 2020-02-20 NOTE — PROGRESS NOTES
Assessment  Diagnoses and all orders for this visit:    Osteoarthritis of left glenohumeral joint        Discussion and Plan:    · Patient was provided with a cortisone injection into her left glenohumeral joint today in the office  This is documented approprietly below  · Again, explained to the patient the different treatments options for the above mentioned diagnosis which include intermittent cortisone injections, activity modification and the final step in the treatment process would be a total shoulder arthroplasty  She has a history of a right total shoulder arthroplasty  She understood and all questions were answered  · Follow up on an as needed basis    Subjective:   Patient ID: Vel Yanez is a 78 y o  female      HPI  77 y/o female presents to the office today for follow-up visit for her left shoulder  She has been treating nonoperatively for known severe osteoarthritis of the left glenohumeral joint  Patient states that she has been during holistic measures to decrease her pain/symptoms in her left shoulder without benefit  She is noting continued pain symptoms intermittently diffusely about the shoulder, especially with overhead and repetitive motions  She states that she has trouble lying on her left side due to pain  She is requesting to received a cortisone injection at this time  Denies numbness and tingling, fever, chills  The following portions of the patient's history were reviewed and updated as appropriate: allergies, current medications, past family history, past medical history, past social history, past surgical history and problem list     Review of Systems   Constitutional: Negative for chills, fever and unexpected weight change  HENT: Negative for hearing loss, nosebleeds and sore throat  Eyes: Negative for pain, redness and visual disturbance  Respiratory: Negative for cough, shortness of breath and wheezing      Cardiovascular: Negative for chest pain, palpitations and leg swelling  Gastrointestinal: Negative for abdominal distention, nausea and vomiting  Endocrine: Negative for polydipsia and polyuria  Genitourinary: Negative for dysuria and hematuria  Skin: Negative for rash and wound  Neurological: Negative for dizziness, numbness and headaches  Psychiatric/Behavioral: Negative for decreased concentration and suicidal ideas  Objective:  /72   Pulse (!) 54   Ht 5' 8" (1 727 m)   Wt 73 5 kg (162 lb)   BMI 24 63 kg/m²       Left Shoulder Exam     Tenderness   The patient is experiencing no tenderness  Range of Motion   External rotation: 60   Forward flexion: 160   Internal rotation 0 degrees: Lumbar     Muscle Strength   Abduction: 4/5     Other   Erythema: absent  Sensation: normal  Pulse: present               Physical Exam   Constitutional: She is oriented to person, place, and time  She appears well-developed and well-nourished  Eyes: Pupils are equal, round, and reactive to light  Pulmonary/Chest: Effort normal and breath sounds normal    Neurological: She is alert and oriented to person, place, and time  Skin: Skin is warm and dry  Psychiatric: She has a normal mood and affect  Her behavior is normal  Judgment and thought content normal      Radiographic studies:   I have personally reviewed the images in the PACS system my interpretation is as follows:     Three views left shoulder from July 25, 2019 show advanced glenohumeral osteoarthritis with mild central glenoid wear      Large joint arthrocentesis: L glenohumeral  Date/Time: 2/20/2020 2:26 PM  Consent given by: patient  Site marked: site marked  Timeout: Immediately prior to procedure a time out was called to verify the correct patient, procedure, equipment, support staff and site/side marked as required   Supporting Documentation  Indications: pain and diagnostic evaluation   Procedure Details  Location: shoulder - L glenohumeral  Preparation: Patient was prepped and draped in the usual sterile fashion  Needle size: 22 G  Ultrasound guidance: no  Approach: posterior  Medications administered: 2 mL bupivacaine 0 25 %; 6 mg betamethasone acetate-betamethasone sodium phosphate 6 (3-3) mg/mL    Patient tolerance: patient tolerated the procedure well with no immediate complications  Dressing:  Sterile dressing applied        Scribe Attestation    I,:   Romel Trinidad am acting as a scribe while in the presence of the attending physician :        I,:   Valeria Rivera MD personally performed the services described in this documentation    as scribed in my presence :

## 2020-06-29 ENCOUNTER — APPOINTMENT (EMERGENCY)
Dept: RADIOLOGY | Facility: HOSPITAL | Age: 70
End: 2020-06-29
Payer: COMMERCIAL

## 2020-06-29 ENCOUNTER — HOSPITAL ENCOUNTER (EMERGENCY)
Facility: HOSPITAL | Age: 70
Discharge: HOME | End: 2020-06-29
Attending: EMERGENCY MEDICINE
Payer: COMMERCIAL

## 2020-06-29 VITALS
RESPIRATION RATE: 18 BRPM | HEART RATE: 64 BPM | DIASTOLIC BLOOD PRESSURE: 82 MMHG | TEMPERATURE: 98.3 F | SYSTOLIC BLOOD PRESSURE: 148 MMHG | OXYGEN SATURATION: 96 %

## 2020-06-29 DIAGNOSIS — S60.221A CONTUSION OF RIGHT HAND, INITIAL ENCOUNTER: ICD-10-CM

## 2020-06-29 DIAGNOSIS — S20.219A CHEST WALL CONTUSION: ICD-10-CM

## 2020-06-29 DIAGNOSIS — S40.012A CONTUSION OF LEFT SHOULDER, INITIAL ENCOUNTER: ICD-10-CM

## 2020-06-29 DIAGNOSIS — S51.819A FOREARM LACERATION: ICD-10-CM
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
## 2020-06-29 DIAGNOSIS — S00.31XA ABRASION OF NOSE, INITIAL ENCOUNTER: ICD-10-CM

## 2020-06-29 DIAGNOSIS — S80.10XA CONTUSION OF LOWER LEG: ICD-10-CM

## 2020-06-29 DIAGNOSIS — S60.211A CONTUSION OF RIGHT WRIST, INITIAL ENCOUNTER: ICD-10-CM

## 2020-06-29 LAB
ATRIAL RATE: 61 BPM
P AXIS: 74 DEGREES
PR INTERVAL: 210 MS
QRS AXIS: -78 DEGREES
QRSD INTERVAL: 92 MS
QT INTERVAL: 446 MS
QTC INTERVAL: 448 MS
T WAVE AXIS: 62 DEGREES
VENTRICULAR RATE: 61 BPM

## 2020-06-29 PROCEDURE — 71046 X-RAY EXAM CHEST 2 VIEWS: CPT

## 2020-06-29 PROCEDURE — 73030 X-RAY EXAM OF SHOULDER: CPT

## 2020-06-29 PROCEDURE — 73590 X-RAY EXAM OF LOWER LEG: CPT

## 2020-06-29 PROCEDURE — 73110 X-RAY EXAM OF WRIST: CPT

## 2020-06-29 PROCEDURE — 99284 EMERGENCY DEPT VISIT MOD MDM: CPT

## 2020-06-29 PROCEDURE — 93010 ELECTROCARDIOGRAM REPORT: CPT | Performed by: INTERNAL MEDICINE

## 2020-06-29 PROCEDURE — 99285 EMERGENCY DEPT VISIT HI MDM: CPT | Performed by: PHYSICIAN ASSISTANT

## 2020-06-29 PROCEDURE — 73130 X-RAY EXAM OF HAND: CPT

## 2020-06-29 PROCEDURE — 93005 ELECTROCARDIOGRAM TRACING: CPT

## 2020-06-29 PROCEDURE — 12001 RPR S/N/AX/GEN/TRNK 2.5CM/<: CPT | Performed by: PHYSICIAN ASSISTANT

## 2020-06-29 RX ORDER — LIDOCAINE HYDROCHLORIDE 10 MG/ML
5 INJECTION, SOLUTION EPIDURAL; INFILTRATION; INTRACAUDAL; PERINEURAL ONCE
Status: COMPLETED | OUTPATIENT
Start: 2020-06-29 | End: 2020-06-29

## 2020-06-29 RX ORDER — BACITRACIN, NEOMYCIN, POLYMYXIN B 400; 3.5; 5 [USP'U]/G; MG/G; [USP'U]/G
1 OINTMENT TOPICAL ONCE
Status: COMPLETED | OUTPATIENT
Start: 2020-06-29 | End: 2020-06-29

## 2020-06-29 RX ADMIN — LIDOCAINE HYDROCHLORIDE 5 ML: 10 INJECTION, SOLUTION EPIDURAL; INFILTRATION; INTRACAUDAL; PERINEURAL at 18:58

## 2020-06-29 RX ADMIN — NEOMYCIN AND POLYMYXIN B SULFATES AND BACITRACIN ZINC 1 SMALL APPLICATION: 400; 3.5; 5 OINTMENT TOPICAL at 20:41

## 2021-03-02 DIAGNOSIS — Z23 ENCOUNTER FOR IMMUNIZATION: ICD-10-CM

## 2024-12-26 ENCOUNTER — APPOINTMENT (OUTPATIENT)
Dept: RADIOLOGY | Facility: OTHER | Age: 74
End: 2024-12-26
Payer: COMMERCIAL

## 2024-12-26 ENCOUNTER — OFFICE VISIT (OUTPATIENT)
Dept: OBGYN CLINIC | Facility: OTHER | Age: 74
End: 2024-12-26
Payer: COMMERCIAL

## 2024-12-26 VITALS — HEIGHT: 68 IN | WEIGHT: 142 LBS | BODY MASS INDEX: 21.52 KG/M2

## 2024-12-26 DIAGNOSIS — M19.012 OSTEOARTHRITIS OF LEFT GLENOHUMERAL JOINT: ICD-10-CM

## 2024-12-26 DIAGNOSIS — G89.29 CHRONIC LEFT SHOULDER PAIN: ICD-10-CM

## 2024-12-26 DIAGNOSIS — M25.512 CHRONIC LEFT SHOULDER PAIN: ICD-10-CM

## 2024-12-26 DIAGNOSIS — Z96.611 HISTORY OF RIGHT SHOULDER REPLACEMENT: ICD-10-CM

## 2024-12-26 DIAGNOSIS — Z96.611 HISTORY OF RIGHT SHOULDER REPLACEMENT: Primary | ICD-10-CM

## 2024-12-26 PROCEDURE — 73030 X-RAY EXAM OF SHOULDER: CPT

## 2024-12-26 PROCEDURE — 20610 DRAIN/INJ JOINT/BURSA W/O US: CPT | Performed by: ORTHOPAEDIC SURGERY

## 2024-12-26 PROCEDURE — 99204 OFFICE O/P NEW MOD 45 MIN: CPT | Performed by: ORTHOPAEDIC SURGERY

## 2024-12-26 RX ORDER — BETAMETHASONE SODIUM PHOSPHATE AND BETAMETHASONE ACETATE 3; 3 MG/ML; MG/ML
6 INJECTION, SUSPENSION INTRA-ARTICULAR; INTRALESIONAL; INTRAMUSCULAR; SOFT TISSUE
Status: COMPLETED | OUTPATIENT
Start: 2024-12-26 | End: 2024-12-26

## 2024-12-26 RX ORDER — BUPIVACAINE HYDROCHLORIDE 2.5 MG/ML
2 INJECTION, SOLUTION EPIDURAL; INFILTRATION; INTRACAUDAL
Status: COMPLETED | OUTPATIENT
Start: 2024-12-26 | End: 2024-12-26

## 2024-12-26 RX ADMIN — BUPIVACAINE HYDROCHLORIDE 2 ML: 2.5 INJECTION, SOLUTION EPIDURAL; INFILTRATION; INTRACAUDAL at 13:45

## 2024-12-26 RX ADMIN — BETAMETHASONE SODIUM PHOSPHATE AND BETAMETHASONE ACETATE 6 MG: 3; 3 INJECTION, SUSPENSION INTRA-ARTICULAR; INTRALESIONAL; INTRAMUSCULAR; SOFT TISSUE at 13:45

## 2024-12-26 NOTE — PROGRESS NOTES
Assessment  Diagnoses and all orders for this visit:    History of right shoulder replacement  -     XR shoulder 2+ vw right; Future    Chronic left shoulder pain  -     XR shoulder 2+ vw left; Future    Osteoarthritis of left glenohumeral joint        Discussion and Plan:    The patient has developed progression of her left glenohumeral osteoarthritis and is experiencing symptoms so treatments were discussed and provided as below.  Treatment option of CSI was provided. Discussed risks and benefits. Patient tolerated procedure well.  Suggested gentle stretching and light ROM to encourage movement in left shoulder  Discussed if suggested home exercise program of gentle stretching does not improve symptoms we can place formal PT referral.    If her symptoms progressed then total shoulder arthroplasty would be an option for her on the left side and we would favor a reverse prosthesis given the glenoid deformity I see on the imaging      Discussed that right TSA looks as expected, no evidence of loosening of the prosthesis or glenoid component and no evidence of rotator cuff failure.  If symptoms persist further evaluation of the prosthesis including a CT arthrogram to evaluate the integrity the rotator cuff as well as an infection workup could be considered but is not required at this time.      Subjective:   Patient ID: Daisy Henderson is a 74 y.o. female    The patient presents with a chief complaint of bilateral shoulder pain.   The pain began a few year(s) ago and is not associated with an acute injury.  R TSA in 2018. The patient describes the pain as aching and sharp in intensity,  right side pinchingintermittent in timing, and localizes the pain to the  bilateral glenohumeral joint.  The pain is worse with movement and relieved by rest.  The pain is not associated with numbness and tingling.  The pain is not associated with constitutional symptoms. The patient is awoken at night by the pain.    The patient  "has had no previous treatment.       The following portions of the patient's history were reviewed and updated as appropriate: allergies, current medications, past family history, past medical history, past social history, past surgical history and problem list.    Review of Systems   All other systems reviewed and are negative.      Objective:  Ht 5' 8\" (1.727 m)   Wt 64.4 kg (142 lb)   BMI 21.59 kg/m²       Right Shoulder Exam     Tenderness   The patient is experiencing tenderness in the acromion.    Range of Motion   Active abduction:  normal   External rotation:  normal     Muscle Strength   Abduction: 4/5   Internal rotation: 4/5   External rotation: 4/5     Other   Erythema: absent  Sensation: normal  Pulse: present      Left Shoulder Exam     Tenderness   The patient is experiencing tenderness in the acromion.    Range of Motion   Active abduction:  normal   External rotation:  normal     Muscle Strength   Abduction: 5/5   Internal rotation: 5/5   External rotation: 5/5     Other   Erythema: absent  Sensation: normal  Pulse: present           Physical Exam  Musculoskeletal:         General: Tenderness present. No swelling, deformity or signs of injury.         I have personally reviewed pertinent films in PACS and my interpretation is as follows.  XR shoulder 2+vw right taken on 12/26/24:  Severe shoulder osteoarthritis with posterior wear of the glenoid  XR shoulder 2+vw left taken on 12/26/24:  Hardware intact, alignment maintained, no acute fractures or osseous abnormalities .  Expected appearance of the humeral stem without evidence of loosening      Large joint arthrocentesis: L glenohumeral  Universal Protocol:  Consent given by: patient  Time out: Immediately prior to procedure a \"time out\" was called to verify the correct patient, procedure, equipment, support staff and site/side marked as required.  Supporting Documentation  Indications: pain   Procedure Details  Location: shoulder - L " glenohumeral  Needle size: 22 G  Ultrasound guidance: no  Approach: posterior  Medications administered: 6 mg betamethasone acetate-betamethasone sodium phosphate 6 (3-3) mg/mL; 2 mL bupivacaine (PF) 0.25 %    Patient tolerance: patient tolerated the procedure well with no immediate complications  Dressing:  Sterile dressing applied           Scribe Attestation      I,:  Cristina Johansen am acting as a scribe while in the presence of the attending physician.:       I,:  Edin James MD personally performed the services described in this documentation    as scribed in my presence.:

## 2025-06-09 ENCOUNTER — OFFICE VISIT (OUTPATIENT)
Dept: OBGYN CLINIC | Facility: OTHER | Age: 75
End: 2025-06-09
Payer: COMMERCIAL

## 2025-06-09 VITALS — WEIGHT: 144 LBS | HEIGHT: 68 IN | BODY MASS INDEX: 21.82 KG/M2

## 2025-06-09 DIAGNOSIS — M19.012 OSTEOARTHRITIS OF LEFT GLENOHUMERAL JOINT: Primary | ICD-10-CM

## 2025-06-09 PROCEDURE — 99213 OFFICE O/P EST LOW 20 MIN: CPT | Performed by: ORTHOPAEDIC SURGERY

## 2025-06-09 PROCEDURE — 20610 DRAIN/INJ JOINT/BURSA W/O US: CPT | Performed by: ORTHOPAEDIC SURGERY

## 2025-06-09 RX ORDER — LOSARTAN POTASSIUM 50 MG/1
TABLET ORAL
COMMUNITY
Start: 2024-12-23

## 2025-06-09 RX ORDER — MELOXICAM 15 MG/1
15 TABLET ORAL DAILY
COMMUNITY

## 2025-06-09 RX ORDER — BUPIVACAINE HYDROCHLORIDE 2.5 MG/ML
2 INJECTION, SOLUTION INFILTRATION; PERINEURAL
Status: COMPLETED | OUTPATIENT
Start: 2025-06-09 | End: 2025-06-09

## 2025-06-09 RX ORDER — BETAMETHASONE SODIUM PHOSPHATE AND BETAMETHASONE ACETATE 3; 3 MG/ML; MG/ML
6 INJECTION, SUSPENSION INTRA-ARTICULAR; INTRALESIONAL; INTRAMUSCULAR; SOFT TISSUE
Status: COMPLETED | OUTPATIENT
Start: 2025-06-09 | End: 2025-06-09

## 2025-06-09 RX ORDER — LEVOTHYROXINE SODIUM 13 UG/1
CAPSULE ORAL
COMMUNITY

## 2025-06-09 RX ORDER — COVID-19 ANTIGEN TEST
KIT MISCELLANEOUS AS NEEDED
COMMUNITY

## 2025-06-09 RX ADMIN — BUPIVACAINE HYDROCHLORIDE 2 ML: 2.5 INJECTION, SOLUTION INFILTRATION; PERINEURAL at 10:00

## 2025-06-09 RX ADMIN — BETAMETHASONE SODIUM PHOSPHATE AND BETAMETHASONE ACETATE 6 MG: 3; 3 INJECTION, SUSPENSION INTRA-ARTICULAR; INTRALESIONAL; INTRAMUSCULAR; SOFT TISSUE at 10:00

## 2025-06-09 NOTE — PROGRESS NOTES
"  Assessment & Plan  Osteoarthritis of left glenohumeral joint  The patient has an examination consistent with left shoulder osteoarthritis.  I have discussed with the patient the pathophysiology of this diagnosis and reviewed how the examination correlates with this diagnosis.  Surgical vs conservative treatment options were discussed at length and after discussing these treatment options, the patient elected for a CS injection today.  Injection can be repeated in 4-6 mos if needed.  Explained the definitive treatment would be total shoulder arthoplasty.      Orders:    Large joint arthrocentesis: L glenohumeral      Subjective:   Patient ID: Daisy Henderson is a 75 y.o. female      HPI  Patient presents today for follow up regarding left shoulder pain.  Patient has known left shoulder osteoarthritis.  She was provided with a CS injection at her last visit on 12/26/24 which lasted until recently. She reports increased pain with ROM, improved with rested.       The following portions of the patient's history were reviewed and updated as appropriate: allergies, current medications, past family history, past medical history, past social history, past surgical history and problem list.        Objective:  Ht 5' 8\" (1.727 m) Comment: verbal  Wt 65.3 kg (144 lb)   BMI 21.90 kg/m²       Left Shoulder Exam     Range of Motion   External rotation:  70   Forward flexion:  130   Internal rotation 0 degrees:  Lumbar     Muscle Strength   Abduction: 4/5   External rotation: 5/5     Other   Erythema: absent  Sensation: normal  Pulse: present           Physical Exam  Vitals reviewed.   Constitutional:       Appearance: She is well-developed.     Eyes:      Pupils: Pupils are equal, round, and reactive to light.     Pulmonary:      Effort: Pulmonary effort is normal.      Breath sounds: Normal breath sounds.   Abdominal:      General: Abdomen is flat. There is no distension.     Skin:     General: Skin is warm and dry. "     Neurological:      Mental Status: She is alert and oriented to person, place, and time.     Psychiatric:         Behavior: Behavior normal.         Thought Content: Thought content normal.         Judgment: Judgment normal.       Large joint arthrocentesis: L glenohumeral    Performed by: Edin James MD  Authorized by: Edin James MD    [facility] Protocol:  procedure performed by consultantConsent: Verbal consent obtained  Consent given by: patient  Patient understanding: patient states understanding of the procedure being performed  Site marked: the operative site was marked  Patient identity confirmed: verbally with patient  Supporting Documentation  Indications: pain     Is this a Visco injection? NoProcedure Details  Location: shoulder - L glenohumeral  Needle size: 22 G  Ultrasound guidance: no  Approach: posterior  Medications administered: 2 mL bupivacaine 0.25 %; 6 mg betamethasone acetate-betamethasone sodium phosphate 6 (3-3) mg/mL    Patient tolerance: patient tolerated the procedure well with no immediate complications  Dressing:  Sterile dressing applied           I have personally reviewed pertinent films in PACS and my interpretation is as follows.    XR shoulder 2+vw left taken on 12/26/24:  Severe shoulder osteoarthritis with posterior wear of the glenoid      Records Reviewed: previous office note     Scribe Attestation      I,:  Kym García MA am acting as a scribe while in the presence of the attending physician.:       I,:  Edin James MD personally performed the services described in this documentation    as scribed in my presence.:

## 2025-06-09 NOTE — ASSESSMENT & PLAN NOTE
The patient has an examination consistent with left shoulder osteoarthritis.  I have discussed with the patient the pathophysiology of this diagnosis and reviewed how the examination correlates with this diagnosis.  Surgical vs conservative treatment options were discussed at length and after discussing these treatment options, the patient elected for a CS injection today.  Injection can be repeated in 4-6 mos if needed.  Explained the definitive treatment would be total shoulder arthoplasty.      Orders:    Large joint arthrocentesis: L glenohumeral

## 2025-06-30 ENCOUNTER — APPOINTMENT (OUTPATIENT)
Dept: RADIOLOGY | Facility: OTHER | Age: 75
End: 2025-06-30
Attending: ORTHOPAEDIC SURGERY
Payer: COMMERCIAL

## 2025-06-30 ENCOUNTER — OFFICE VISIT (OUTPATIENT)
Dept: OBGYN CLINIC | Facility: OTHER | Age: 75
End: 2025-06-30
Payer: COMMERCIAL

## 2025-06-30 DIAGNOSIS — M19.012 PRIMARY OSTEOARTHRITIS OF LEFT SHOULDER: Primary | ICD-10-CM

## 2025-06-30 DIAGNOSIS — S16.1XXA STRAIN OF NECK MUSCLE, INITIAL ENCOUNTER: ICD-10-CM

## 2025-06-30 DIAGNOSIS — M25.512 LEFT SHOULDER PAIN, UNSPECIFIED CHRONICITY: ICD-10-CM

## 2025-06-30 PROCEDURE — 73030 X-RAY EXAM OF SHOULDER: CPT

## 2025-06-30 PROCEDURE — 99214 OFFICE O/P EST MOD 30 MIN: CPT | Performed by: ORTHOPAEDIC SURGERY

## 2025-06-30 NOTE — ASSESSMENT & PLAN NOTE
Explained to the patient that her x rays today do not show an acute fracture/dislocation of her left shoulder. She has severe glenohumeral joint osteoarthritis that she may have flared up during this fall but her examination today is consistent with a cervical strain most likely causing radicular symptoms down her left arm and into her left hand as most of her pain is in her cervical spine/scapula with noted pain radiating down her left arm.   She was provided with a referral to formal physical therapy for a cervical strain/radiculopathy.   If these symptoms persist, then I would recommend a referral to our spine and pain center for further evaluation and treatment. She understood and all questions were answered  She may follow up on an as needed basis for her left shoulder osteoarthritis.

## 2025-06-30 NOTE — PROGRESS NOTES
Assessment & Plan  Primary osteoarthritis of left shoulder  Strain of neck muscle, initial encounter  Explained to the patient that her x rays today do not show an acute fracture/dislocation of her left shoulder. She has severe glenohumeral joint osteoarthritis that she may have flared up during this fall but her examination today is consistent with a cervical strain most likely causing radicular symptoms down her left arm and into her left hand as most of her pain is in her cervical spine/scapula with noted pain radiating down her left arm.   She was provided with a referral to formal physical therapy for a cervical strain/radiculopathy.   If these symptoms persist, then I would recommend a referral to our spine and pain center for further evaluation and treatment. She understood and all questions were answered  She may follow up on an as needed basis for her left shoulder osteoarthritis.         Subjective:   Patient ID: Daisy Henderson is a 75 y.o. female presents today for a follow up visit for her left shoulder. Patient reports that on 6/20/25 she fell onto her left shoulder at home. Since, this fall she notes increased cervical spine/scapular pain with radiating pain down her arm into her forearm, wrist and hand. She reports that she still has good ROM of the shoulder which causes only mild symptoms/pain. She is currently taking a muscle relaxant as prescribed by PCP without benefit. She reports that her CSI on 6/9/25 did provide her with benefit. No fevers or chills.     The following portions of the patient's history were reviewed and updated as appropriate: allergies, current medications, past family history, past medical history, past social history, past surgical history and problem list.    Objective:  There were no vitals taken for this visit.      Left Shoulder Exam     Range of Motion   External rotation:  20   Forward flexion:  130     Muscle Strength   Abduction: 4/5     Other   Erythema:  absent  Sensation: normal  Pulse: present             Physical Exam  Constitutional:       Appearance: She is well-developed.     Eyes:      Pupils: Pupils are equal, round, and reactive to light.     Pulmonary:      Effort: Pulmonary effort is normal.      Breath sounds: Normal breath sounds.     Skin:     General: Skin is warm and dry.     Neurological:      Mental Status: She is alert and oriented to person, place, and time.     Psychiatric:         Behavior: Behavior normal.         Thought Content: Thought content normal.         Judgment: Judgment normal.         I have personally reviewed pertinent films in PACS and my interpretation is as follows.    X Ray Left Shoulder 6/30/25: Severe glenohumeral joint osteoarthritis with posterior glenoid wear. No acute fracture or dislocation      Records Reviewed: office notes from PCP    Scribe Attestation      I,:  Ric Hughes am acting as a scribe while in the presence of the attending physician.:       I,:  Edin James MD personally performed the services described in this documentation    as scribed in my presence.:

## 2025-07-08 ENCOUNTER — TELEPHONE (OUTPATIENT)
Dept: OBGYN CLINIC | Facility: HOSPITAL | Age: 75
End: 2025-07-08

## 2025-07-08 ENCOUNTER — EVALUATION (OUTPATIENT)
Dept: PHYSICAL THERAPY | Facility: REHABILITATION | Age: 75
End: 2025-07-08
Attending: ORTHOPAEDIC SURGERY
Payer: COMMERCIAL

## 2025-07-08 DIAGNOSIS — M19.012 PRIMARY OSTEOARTHRITIS OF LEFT SHOULDER: ICD-10-CM

## 2025-07-08 DIAGNOSIS — M54.2 ACUTE NECK PAIN: Primary | ICD-10-CM

## 2025-07-08 DIAGNOSIS — S16.1XXA STRAIN OF NECK MUSCLE, INITIAL ENCOUNTER: ICD-10-CM

## 2025-07-08 PROCEDURE — 97112 NEUROMUSCULAR REEDUCATION: CPT | Performed by: PHYSICAL THERAPIST

## 2025-07-08 PROCEDURE — 97161 PT EVAL LOW COMPLEX 20 MIN: CPT | Performed by: PHYSICAL THERAPIST

## 2025-07-08 NOTE — TELEPHONE ENCOUNTER
Caller: pt     Doctor: Dr. James     Reason for call: requesting a script for [facility] as she is in a lot of pain. Pt scheduled for PT this afternoon. Provided [facility] phone number as well.     Call back#: Phone: [phone]

## 2025-07-08 NOTE — PROGRESS NOTES
PT Evaluation     Today's date: 2025  Patient name: Daisy Henderson  : 1950  MRN: [id number]  Referring provider: Edin James*  Dx:   Encounter Diagnosis     ICD-10-CM    1. Acute neck pain  M54.2       2. Primary osteoarthritis of left shoulder  M19.012 Ambulatory Referral to Physical Therapy      3. Strain of neck muscle, initial encounter  S16.1XXA Ambulatory Referral to Physical Therapy          Start Time: 1630  Stop Time:   Total time in clinic (min): 45 minutes    Assessment  Impairments: abnormal coordination, abnormal gait, abnormal muscle firing, abnormal muscle tone, abnormal or restricted ROM, abnormal movement, activity intolerance, impaired balance, impaired physical strength, lacks appropriate home exercise program, pain with function, scapular dyskinesis, weight-bearing intolerance, poor posture , poor body mechanics, participation limitations, activity limitations and endurance    Assessment details: Problem List:  1) cervical spine mobility deficit (primarily left rotation)  2) left UE poor neurodynamics    Daisy Henderson is a pleasant 75 y.o. female who presents with a chief complaint of left shoulder and neck pain after a fall towards the end of . She was evaluated and referred to physical therapy by Dr. James within orthopedics. Currently, she has a primary movement impairment problem list which consists of cervical spine hypomobility, primarily with left rotation, poor left UE neurodynamics, and poor thoracic spine mobility, resulting in pathoanatomical symptoms consistent with cervical radiculopathy. The patient was in a highly irritable state today, but began to respond to mechanical loading of self repeated movements of the cervical spine today, marginally improving functional baselines  No further referral appears necessary at this time based upon examination results.  I expect she will improve over the next 6-8 weeks. Current clinical findings and  course of care explained to the patient today, who verbalized understanding and agreed to proceed with treatment.         Comparable signs:  1) active left cervical rotation  2) left median nerve tension test (ULTTA)  Understanding of Dx/Px/POC: good     Prognosis: good    Goals  Impairment Based Goals:  1. Decreased pain by 50% in 4 weeks.  2. Improve ROM to WFL by discharge.   3. Improve strength by 1/2 measure in 6 weeks.   4. Improve FOTO greater than predicted outcome score by discharge.      Functional Based Goals: To be met upon discharge  1. Patient will be able to return to playing golf.   2. Patient will be fully independent with HEP by discharge  3. Patient will be able to manage symptoms independently.       Plan  Patient would benefit from: skilled physical therapy  Referral necessary: No    Planned therapy interventions: abdominal trunk stabilization, activity modification, balance, balance/weight bearing training, behavior modification, body mechanics training, breathing training, coordination, compression, flexibility, graded activity, gait training, functional ROM exercises, graded exercise, graded motor, home exercise program, therapeutic activities, therapeutic exercise, stretching, strengthening, self care, postural training, patient/caregiver education, neuromuscular re-education, nerve gliding, motor coordination training, manual therapy, massage, joint mobilization and IASTM    Frequency: 2x week  Plan of Care beginning date: 7/8/2025  Plan of Care expiration date: 9/16/2025  Treatment plan discussed with: patient        Subjective Evaluation    History of Present Illness  Mechanism of injury: A took a fall in my yard about 3 weeks ago. At first, I did not have much of a problem. I did play golf 4 days later, and then I started to have sharp pain in my upper, left back. I did eventually go to consult with Dr. James. I was prescribed a muscle relaxer which I took at night. I was also  prescribed Predisone, which I just finished yesterday. I am only taking Tyenol now or OTC. It seems especially worse in the morning. Now that I am off of the prednisone it seems like it is all day. I did start to notice last night that I am having some tingling in my left fingers. I do have pain that refers down to my elbow a lot, and intermittently down to my hand.     Hand Dominance: Right     Symptom Progression: Worse in the last week     Night Pain: Yes     5 D's 3 N's: All Negative     Leisure/Hobbies: Golfing, taking walks           Not a recurrent problem   Patient Goals  Patient goals for therapy: decreased pain, increased motion, return to sport/leisure activities, independence with ADLs/IADLs and increased strength    Pain  Current pain ratin  At best pain ratin  At worst pain rating: 10  Quality: discomfort, dull ache, sharp, tight and radiating  Relieving factors: relaxation, rest and change in position    Treatments  Previous treatment: medication        Objective     General Comments:      Cervical/Thoracic Comments  Myotomes  Strength WNL bilaterally    Dermatomes  Sensation Intact Bilaterally    Reflexes  R Biceps: 3+  L Biceps: 3+  R Brachioradialis: 3+  L Brachioradialis: 3+  R Triceps: 3+  L Triceps: 3+    Neurodynamic Testing  Median Nerve: +L    Cervical Active Range of Motion  Movement Loss Ezekiel Mod Min Nil Symptoms  Protrusion   x       Flexion   x      Retraction       Extension       R Lat Flex       L Lat Flex       R Rotation       L Rotation         Huong Assessment  Sitting:  Rep RET: Decreased/Better      Special Tests  Sharp Augustine= (-), Alar Ligament= (-) Compression= (+), Spurlings= (+), Distraction= (+), VBI test (-)           Access Code: [email or web address]  URL: [email or web address]  Date: 2025  Prepared by: Hussein Lin    Exercises  - Seated Scapular Retraction  - 4-5 x daily - 7 x weekly - 3 sets - 10 reps  - Seated Cervical Retraction  - 4-5 x daily - 7  x weekly - 3 sets - 10 reps  POC expires Unit limit Auth Expiration date PT/OT + Visit Limit?   9/16/2025  BOMN  BOMN         Visit/Unit Tracking  AUTH Status:  Date 7/8        DONELL Leyva - No Auth Required Used 1         Remaining                   Precautions: Previous history of thyroid cancer (in remission)      Manuals 7/8                                                                Neuro Re-Ed             Patient Education 15'            Rows             Shoulder Extensions             Seated Thoracic Rotations with Dowel                                                    Ther Ex             Repeated Chi nTucks Seated Reassess nv (HEP)            Supine Thoracic Mobility Series             Seated Thoracic Extension - Hands Supporting Neck)             Cervical SNAGs                                                                 Ther Activity                                       Gait Training                                       Modalities

## 2025-07-11 ENCOUNTER — CONSULT (OUTPATIENT)
Dept: PAIN MEDICINE | Facility: CLINIC | Age: 75
End: 2025-07-11
Payer: COMMERCIAL

## 2025-07-11 ENCOUNTER — OFFICE VISIT (OUTPATIENT)
Dept: PHYSICAL THERAPY | Facility: REHABILITATION | Age: 75
End: 2025-07-11
Attending: ORTHOPAEDIC SURGERY
Payer: COMMERCIAL

## 2025-07-11 VITALS — HEIGHT: 68 IN | WEIGHT: 144 LBS | BODY MASS INDEX: 21.82 KG/M2

## 2025-07-11 DIAGNOSIS — M54.12 CERVICAL RADICULOPATHY: Primary | ICD-10-CM

## 2025-07-11 DIAGNOSIS — S16.1XXA STRAIN OF NECK MUSCLE, INITIAL ENCOUNTER: ICD-10-CM

## 2025-07-11 DIAGNOSIS — M19.012 PRIMARY OSTEOARTHRITIS OF LEFT SHOULDER: Primary | ICD-10-CM

## 2025-07-11 DIAGNOSIS — M54.2 ACUTE NECK PAIN: ICD-10-CM

## 2025-07-11 PROCEDURE — 97140 MANUAL THERAPY 1/> REGIONS: CPT | Performed by: PHYSICAL MEDICINE & REHABILITATION

## 2025-07-11 PROCEDURE — 99204 OFFICE O/P NEW MOD 45 MIN: CPT | Performed by: ANESTHESIOLOGY

## 2025-07-11 PROCEDURE — G2211 COMPLEX E/M VISIT ADD ON: HCPCS | Performed by: ANESTHESIOLOGY

## 2025-07-11 RX ORDER — PREDNISONE 10 MG/1
TABLET ORAL
Qty: 39 TABLET | Refills: 0 | Status: SHIPPED | OUTPATIENT
Start: 2025-07-11

## 2025-07-11 NOTE — PROGRESS NOTES
Daily Note     Today's date: 2025  Patient name: Daisy Henderson  : 1950  MRN: [id number]  Referring provider: Edin James*  Dx:   Encounter Diagnosis     ICD-10-CM    1. Primary osteoarthritis of left shoulder  M19.012       2. Strain of neck muscle, initial encounter  S16.1XXA       3. Acute neck pain  M54.2                    Subjective: Patient presents with 9/10 pain to begin session. Saw Neurology this morning, will have MRI upcoming. Began another prednisone taper today. Increased pain/decreased range with chin tuck throughout the day.     Objective: See treatment diary below    Assessment: Tolerated treatment well. Discussed neck support during sleep, limiting range to avoid pain during exercise, using R lateral cervical flexion to offer decompression of L side. Positive response to manual therapy with decreased L UT tension and scap elevation end of session. Patient would benefit from continued PT. Continue as able per patient tolerance nv.     Plan: Continue per plan of care.      Access Code: [email or web address]  URL: [email or web address]  Date: 2025  Prepared by: Hussein Lin    Exercises  - Seated Scapular Retraction  - 4-5 x daily - 7 x weekly - 3 sets - 10 reps  - Seated Cervical Retraction  - 4-5 x daily - 7 x weekly - 3 sets - 10 reps  POC expires Unit limit Auth Expiration date PT/OT + Visit Limit?   2025  BOMN  BOMN         Visit/Unit Tracking  AUTH Status:  Date         Gordon - No Auth Required Used 1 2        Remaining               Precautions: Previous history of thyroid cancer (in remission)    Manuals              TPR L UT area, gentle 1st rib mob, in sitting, LH                                                  Neuro Re-Ed             Patient Education 15'            Rows             Shoulder Extensions             Seated Thoracic Rotations with Dowel                                                    Ther Ex              Repeated Chi nTucks Seated Reassess nv (HEP) P!           Supine Thoracic Mobility Series             Seated Thoracic Extension - Hands Supporting Neck)             Cervical SNAGs               R lateral flexion, 5-10x             Upper cervical nod 5-10x                                     Ther Activity                                       Gait Training                                       Modalities

## 2025-07-11 NOTE — PROGRESS NOTES
Name: Daisy Henderson      : 1950      MRN: [id number]  Encounter Provider: Kedar Bo MD  Encounter Date: 2025   Encounter department: [facility] [place]  Assessment & Plan  Cervical radiculopathy    Orders:    predniSONE 10 mg tablet; Take 60mg x3 days, 40mg x3 days, 20mg x3days, 10mg x3 days    MRI cervical spine wo contrast; Future      Pain is consistent with cervical radicular pain accompanied by consistent moderate to severe pain on the pain scale (5+/10) with inability to participate in IADLs for >1 month.    Patient still in severe 10/10 despite recent PT eval - recommend obtaining cervical MRI for further evaluation. Suspect nerve root pathology, given significant multilevel degenerative changes and foraminal stenosis seen on Xrs from years ago.    Start prednisone taper at 60mg.    Will f/u after MRI to discuss possible cervical ELLIE if symptoms unchanged.    Reviewed Orthopedic surgery, PT notes.    Reviewed renal function, CBC prior to recommending, initiating, continuing and/or adjusting medications.    Reviewed hemoglobin A1c, renal function, CBC and/or PT/INR prior to discussing/offering interventional modalities.    My impressions and treatment recommendations were discussed in detail with the patient who verbalized understanding and had no further questions.  Discharge instructions were provided. I personally saw and examined the patient and I agree with the above discussed plan of care.    History of Present Illness     Daisy Henderson is a 75 y.o. female presenting for consultation at [facility] for exam and evaluation of ongoing left radiating arm pain for 1 month. Pain started after a fall 23. Over the past month, the intensity of pain has been Severe. Pain is currently 10/10. Pain does interfere with age appropriate activities of daily living. Pain is constant, with no typical pattern throughout the day. Pain is described  "as sharp, throbbing. Patient endorses weakness in the left arm. Assistance device used: None.    Worsening factors noted: standing, walking, exercise.   Improving factors noted: unknown.    Treatments tried:   Heat/ice: yes  TENS: yes  PT: no  Chiropractic therapy: no  Injections: no   Previous spine surgery: No    Anticoagulation: no    Medications tried:   Tylenol, NSAIDs, oral steroids    Review of Systems   Constitutional:  Negative for chills and fever.   HENT:  Negative for ear pain and sore throat.    Eyes:  Negative for pain and visual disturbance.   Respiratory:  Negative for cough and shortness of breath.    Cardiovascular:  Negative for chest pain and palpitations.   Gastrointestinal:  Negative for abdominal pain and vomiting.   Genitourinary:  Negative for dysuria and hematuria.   Musculoskeletal:  Positive for myalgias, neck pain and neck stiffness. Negative for arthralgias and back pain.   Skin:  Negative for color change and rash.   Neurological:  Positive for weakness and numbness. Negative for seizures and syncope.   All other systems reviewed and are negative.    Medical History Reviewed by provider this encounter:     .  Medications Ordered Prior to Encounter[1]      Objective   Ht 5' 8\" (1.727 m)   Wt 65.3 kg (144 lb)   BMI 21.90 kg/m²      Pain Score: 10-Worst pain ever  Physical Exam  Constitutional: normal, well developed, well nourished, alert, in no distress and non-toxic and no overt pain behavior.  Eyes: anicteric  HEENT: grossly intact  Neck: supple, symmetric, trachea midline and no masses   Pulmonary: even and unlabored  Cardiovascular: No edema or pitting edema present  Skin: Normal without rashes or lesions and well hydrated  Psychiatric: Mood and affect appropriate  Neurologic: Motor function is grossly intact with no focal neurologic deficits   Musculoskeletal: Positive left Spurling's, limited LUE ROM with abduction    Radiology Results Review: I personally reviewed the following " image studies in PACS and associated radiology reports: xray(s). My interpretation of the radiology images/reports is: cervical Xrs 2018 - moderate to severe multilevel degenerative changes C3-C7 with anterolisthesis C3 on C4, multilevel foraminal stenosis throughout.           [1]   Current Outpatient Medications on File Prior to Visit   Medication Sig Dispense Refill    Calcium Carbonate-Vitamin D (CALTRATE 600+D PO) Take by mouth      Cholecalciferol 1000 units tablet Take 1,000 Units by mouth      escitalopram (LEXAPRO) 10 mg tablet 10 mg daily at bedtime      levothyroxine 125 mcg tablet Take 1 tablet by mouth      Levothyroxine Sodium 13 MCG CAPS Take by mouth      losartan (COZAAR) 50 mg tablet Take by mouth      meloxicam (MOBIC) 15 mg tablet Take 15 mg by mouth daily      Multiple Vitamins-Minerals (CENTRUM SILVER PO) Take by mouth      Naproxen Sodium (Aleve) 220 MG CAPS Take by mouth if needed      valsartan (DIOVAN) 160 mg tablet Take 160 mg by mouth daily at bedtime   (Patient not taking: Reported on 6/30/2025)       No current facility-administered medications on file prior to visit.

## 2025-07-15 ENCOUNTER — OFFICE VISIT (OUTPATIENT)
Dept: PHYSICAL THERAPY | Facility: REHABILITATION | Age: 75
End: 2025-07-15
Attending: ORTHOPAEDIC SURGERY
Payer: COMMERCIAL

## 2025-07-15 DIAGNOSIS — M54.2 ACUTE NECK PAIN: Primary | ICD-10-CM

## 2025-07-15 PROCEDURE — 97140 MANUAL THERAPY 1/> REGIONS: CPT | Performed by: PHYSICAL THERAPIST

## 2025-07-17 ENCOUNTER — OFFICE VISIT (OUTPATIENT)
Dept: PHYSICAL THERAPY | Facility: REHABILITATION | Age: 75
End: 2025-07-17
Attending: ORTHOPAEDIC SURGERY
Payer: COMMERCIAL

## 2025-07-17 DIAGNOSIS — M54.2 ACUTE NECK PAIN: Primary | ICD-10-CM

## 2025-07-17 DIAGNOSIS — S16.1XXA STRAIN OF NECK MUSCLE, INITIAL ENCOUNTER: ICD-10-CM

## 2025-07-17 PROCEDURE — 97112 NEUROMUSCULAR REEDUCATION: CPT | Performed by: PHYSICAL THERAPIST

## 2025-07-17 PROCEDURE — 97140 MANUAL THERAPY 1/> REGIONS: CPT | Performed by: PHYSICAL THERAPIST

## 2025-07-17 PROCEDURE — 97110 THERAPEUTIC EXERCISES: CPT | Performed by: PHYSICAL THERAPIST

## 2025-07-17 NOTE — PROGRESS NOTES
Daily Note     Today's date: 2025  Patient name: Daisy Henderson  : 1950  MRN: [id number]  Referring provider: Edin James*  Dx:   Encounter Diagnosis     ICD-10-CM    1. Acute neck pain  M54.2       2. Strain of neck muscle, initial encounter  S16.1XXA                      Subjective: I have been feeling better the past 2 days. Not as much pain and starting to feel a bit more motion in my neck.       Objective: See treatment diary below      Assessment: Tolerated treatment well. Patient demonstrated an improvement in her baseline symptoms and range of motion today. Does still have significant limitations, but starting to have less irritability and better tolerance to movement. Patient would benefit from continued PT      Plan: Continue per plan of care.      Access Code: [email or web address]  URL: [email or web address]  Date: 2025  Prepared by: Hussein Lin    Exercises  - Seated Scapular Retraction  - 4-5 x daily - 7 x weekly - 3 sets - 10 reps  - Seated Cervical Retraction  - 4-5 x daily - 7 x weekly - 3 sets - 10 reps  POC expires Unit limit Auth Expiration date PT/OT + Visit Limit?   2025  BOMN  BOMN         Visit/Unit Tracking  AUTH Status:  Date        DONELL Leyva - No Auth Required Used 1 2        Remaining               Precautions: Previous history of thyroid cancer (in remission)    Manuals 7/8 7/11 7/15 7/17           TPR L UT area, gentle 1st rib mob, in sitting, LH SE SE                                                Neuro Re-Ed             Patient Education 15'  10' 10' Updated HEP         Rows             Shoulder Extensions             Seated Thoracic Rotations with Dowel    2x10         BSER Seated    Ytb 2x10                                   Ther Ex             Repeated Chi nTucks Seated Reassess nv (HEP) P!           Supine Thoracic Mobility Series             Seated Thoracic Extension - Hands Supporting Neck)             SNAG Right     "2x10           R lateral flexion, 5-10x np            Upper cervical nod 5-10x           Neck Circles   2x15          1st rib strap mob   L 10x5\" with inh/exh Reviewed for HEP         Scapular Retraction   2x15 2x15         Ther Activity                                       Gait Training                                       Modalities                                                "

## 2025-07-21 ENCOUNTER — HOSPITAL ENCOUNTER (OUTPATIENT)
Dept: RADIOLOGY | Age: 75
Discharge: HOME | End: 2025-07-21
Attending: ANESTHESIOLOGY
Payer: COMMERCIAL

## 2025-07-21 DIAGNOSIS — M54.12 CERVICAL RADICULOPATHY: ICD-10-CM

## 2025-07-21 PROCEDURE — 72141 MRI NECK SPINE W/O DYE: CPT

## 2025-07-22 ENCOUNTER — APPOINTMENT (OUTPATIENT)
Dept: PHYSICAL THERAPY | Facility: REHABILITATION | Age: 75
End: 2025-07-22
Attending: ORTHOPAEDIC SURGERY
Payer: COMMERCIAL

## 2025-07-25 ENCOUNTER — TELEPHONE (OUTPATIENT)
Age: 75
End: 2025-07-25

## (undated) DEVICE — INTENDED FOR TISSUE SEPARATION, AND OTHER PROCEDURES THAT REQUIRE A SHARP SURGICAL BLADE TO PUNCTURE OR CUT.: Brand: BARD-PARKER SAFETY BLADES SIZE 10, STERILE

## (undated) DEVICE — PLUMEPEN PRO 10FT

## (undated) DEVICE — HOOD: Brand: FLYTE, SURGICOOL

## (undated) DEVICE — PROXIMATE PLUS MD MULTI-DIRECTIONAL RELEASE SKIN STAPLERS CONTAINS 35 STAINLESS STEEL STAPLES APPROXIMATE CLOSED DIMENSIONS: 6.9MM X 3.9MM WIDE: Brand: PROXIMATE

## (undated) DEVICE — GLOVE INDICATOR PI UNDERGLOVE SZ 7.5 BLUE

## (undated) DEVICE — ARTHROSCOPY FLOOR MAT

## (undated) DEVICE — SUT VICRYL PLUS 2-0 CTB-1 27 IN VCPB259H

## (undated) DEVICE — SUT 2 ORTHOCORD MO7

## (undated) DEVICE — DUAL CUT SAGITTAL BLADE

## (undated) DEVICE — ADHESIVE SKN CLSR HISTOACRYL FLEX 0.5ML LF

## (undated) DEVICE — HEAVY DUTY TABLE COVER: Brand: CONVERTORS

## (undated) DEVICE — THE SIMPULSE SOLO SYSTEM WITH ULTREX RETRACTABLE SPLASH SHIELD TIP: Brand: SIMPULSE SOLO

## (undated) DEVICE — SPONGE PVP SCRUB WING STERILE

## (undated) DEVICE — IMPERVIOUS STOCKINETTE: Brand: DEROYAL

## (undated) DEVICE — KIT STABILIZATION SHOULDER MARCO

## (undated) DEVICE — SUT 2 ORTHOCORD 36 IN W/O NEEDLES

## (undated) DEVICE — GLOVE SRG BIOGEL 7.5

## (undated) DEVICE — CHLORAPREP HI-LITE 26ML ORANGE

## (undated) DEVICE — INTENT TO BE USED WITH SUTURE MATERIAL FOR TISSUE CLOSURE: Brand: RICHARD-ALLAN®  NEEDLE 1/2 CIRCLE REVERSE CUTTING

## (undated) DEVICE — CAPIT ASCEND FLEX TSA W PERF PLUS

## (undated) DEVICE — PACK MAJOR ORTHO W/SPLITS PBDS

## (undated) DEVICE — GUIDE PIN 2.5 X 220MM AEQUALIS PERFORM PLUS

## (undated) DEVICE — 60 ML SYRINGE,REGULAR TIP: Brand: MONOJECT

## (undated) DEVICE — REM POLYHESIVE ADULT PATIENT RETURN ELECTRODE: Brand: VALLEYLAB

## (undated) DEVICE — SUT VICRYL PLUS 0 CTB-1 27 IN VCPB260H

## (undated) DEVICE — DRESSING MEPILEX AG BORDER 4 X 8 IN

## (undated) DEVICE — 3M™ IOBAN™ 2 ANTIMICROBIAL INCISE DRAPE 6650EZ: Brand: IOBAN™ 2